# Patient Record
Sex: FEMALE | Race: WHITE | NOT HISPANIC OR LATINO | Employment: OTHER | URBAN - METROPOLITAN AREA
[De-identification: names, ages, dates, MRNs, and addresses within clinical notes are randomized per-mention and may not be internally consistent; named-entity substitution may affect disease eponyms.]

---

## 2018-01-11 NOTE — MISCELLANEOUS
Message   Recorded as Task   Date: 09/02/2016 09:49 AM, Created By: Wale Oneal   Task Name: Follow Up   Assigned To: Iliana Aguila   Regarding Patient: Lizabeth Meigs, Status: Active   Comment:    SdAna Maria - 02 Sep 2016 9:49 AM     TASK CREATED  Caller: Self; [phone] (Home); [phone] (Work)  Pt is going to stop taking her Arimidex this month , should she come in for follow up appointment  Ana Maria Beaver - 02 Sep 2016 10:10 AM     TASK EDITED   Spoke with pt who hasn't been seen since 2014 and informed does need to continue FU  Scheduled appt with WILLIAM Garcia on 9/16/16  Will mail lab slip and provide 30 day supply of Arimidex  Active Problems    1  Arthritis (716 90) (M19 90)   2  Cat scratch (919 0,E906 8) (T14 8,W55 03XA)   3  Hypertension (401 9) (I10)   4  Lower abdominal pain (789 09) (R10 30)   5  Malignant neoplasm of breast (174 9) (C50 919)   6  Screening for cervical cancer (V76 2) (Z12 4)   7  Screening for colorectal cancer (V76 51) (Z12 11,Z12 12)    Current Meds   1  Anastrozole 1 MG Oral Tablet; TAKE 1 TABLET ONCE DAILY; Last Rx:[id number]   Ordered   2  Aspir-Low 81 MG Oral Tablet Delayed Release; TAKE 1 TABLET DAILY; Therapy: (Recorded:04May2013) to Recorded   3  Calcium Citrate + TABS; Therapy: (Recorded:04May2013) to Recorded   4  Cod Liver Oil 10 MINIM Oral Capsule Recorded   5  Coreg 6 25 MG Oral Tablet (Carvedilol); Therapy: ([id number]) to Recorded   6  Multi-Day Vitamins TABS; Therapy: (Recorded:04May2013) to Recorded   7  Vitamin D CAPS; Therapy: (Recorded:04May2013) to Recorded    Allergies    1  Tylenol PM Extra Strength TABS    Plan  Malignant neoplasm of breast    · (1) CA 27 29; Status:Active - Retrospective By Protocol Authorization; Requested  for:[id number];    · (1) CBC/PLT/DIFF; Status:Active - Retrospective By Protocol Authorization;  Requested  for:[id number];    · (1) COMPREHENSIVE METABOLIC PANEL; Status:Active - Retrospective By Protocol  Authorization;  Requested BFA:[id number];     Signatures   Electronically signed by : Erik Gordon RN; Sep  2 2016 11:17AM EST                       (Author)

## 2018-05-10 ENCOUNTER — TRANSCRIBE ORDERS (OUTPATIENT)
Dept: LAB | Facility: HOSPITAL | Age: 58
End: 2018-05-10

## 2018-05-10 ENCOUNTER — APPOINTMENT (OUTPATIENT)
Dept: LAB | Facility: HOSPITAL | Age: 58
End: 2018-05-10
Attending: INTERNAL MEDICINE
Payer: COMMERCIAL

## 2018-05-10 DIAGNOSIS — I10 ESSENTIAL HYPERTENSION, MALIGNANT: ICD-10-CM

## 2018-05-10 DIAGNOSIS — E55.9 AVITAMINOSIS D: ICD-10-CM

## 2018-05-10 DIAGNOSIS — I10 ESSENTIAL HYPERTENSION, MALIGNANT: Primary | ICD-10-CM

## 2018-05-10 LAB
25(OH)D3 SERPL-MCNC: 33.3 NG/ML (ref 30–100)
BACTERIA UR QL AUTO: ABNORMAL /HPF
BASOPHILS # BLD AUTO: 0.03 THOUSANDS/ΜL (ref 0–0.1)
BASOPHILS NFR BLD AUTO: 0 % (ref 0–1)
BILIRUB UR QL STRIP: NEGATIVE
CHOLEST SERPL-MCNC: 144 MG/DL (ref 50–200)
CLARITY UR: ABNORMAL
COLOR UR: YELLOW
EOSINOPHIL # BLD AUTO: 0.11 THOUSAND/ΜL (ref 0–0.61)
EOSINOPHIL NFR BLD AUTO: 1 % (ref 0–6)
ERYTHROCYTE [DISTWIDTH] IN BLOOD BY AUTOMATED COUNT: 12.6 % (ref 11.6–15.1)
GLUCOSE UR STRIP-MCNC: NEGATIVE MG/DL
HCT VFR BLD AUTO: 42.1 % (ref 34.8–46.1)
HDLC SERPL-MCNC: 38 MG/DL (ref 40–60)
HGB BLD-MCNC: 13.8 G/DL (ref 11.5–15.4)
HGB UR QL STRIP.AUTO: NEGATIVE
HYALINE CASTS #/AREA URNS LPF: ABNORMAL /LPF
KETONES UR STRIP-MCNC: NEGATIVE MG/DL
LDLC SERPL CALC-MCNC: 88 MG/DL (ref 0–100)
LEUKOCYTE ESTERASE UR QL STRIP: NEGATIVE
LYMPHOCYTES # BLD AUTO: 1.99 THOUSANDS/ΜL (ref 0.6–4.47)
LYMPHOCYTES NFR BLD AUTO: 26 % (ref 14–44)
MCH RBC QN AUTO: 31.6 PG (ref 26.8–34.3)
MCHC RBC AUTO-ENTMCNC: 32.8 G/DL (ref 31.4–37.4)
MCV RBC AUTO: 96 FL (ref 82–98)
MONOCYTES # BLD AUTO: 0.53 THOUSAND/ΜL (ref 0.17–1.22)
MONOCYTES NFR BLD AUTO: 7 % (ref 4–12)
NEUTROPHILS # BLD AUTO: 5 THOUSANDS/ΜL (ref 1.85–7.62)
NEUTS SEG NFR BLD AUTO: 66 % (ref 43–75)
NITRITE UR QL STRIP: NEGATIVE
NON-SQ EPI CELLS URNS QL MICRO: ABNORMAL /HPF
NONHDLC SERPL-MCNC: 106 MG/DL
NRBC BLD AUTO-RTO: 0 /100 WBCS
PH UR STRIP.AUTO: 6 [PH] (ref 4.5–8)
PLATELET # BLD AUTO: 258 THOUSANDS/UL (ref 149–390)
PMV BLD AUTO: 11.4 FL (ref 8.9–12.7)
PROT UR STRIP-MCNC: NEGATIVE MG/DL
RBC # BLD AUTO: 4.37 MILLION/UL (ref 3.81–5.12)
RBC #/AREA URNS AUTO: ABNORMAL /HPF
SP GR UR STRIP.AUTO: 1.01 (ref 1–1.03)
TRIGL SERPL-MCNC: 88 MG/DL
UROBILINOGEN UR QL STRIP.AUTO: 0.2 E.U./DL
WBC # BLD AUTO: 7.69 THOUSAND/UL (ref 4.31–10.16)
WBC #/AREA URNS AUTO: ABNORMAL /HPF

## 2018-05-10 PROCEDURE — 85025 COMPLETE CBC W/AUTO DIFF WBC: CPT

## 2018-05-10 PROCEDURE — 80061 LIPID PANEL: CPT

## 2018-05-10 PROCEDURE — 36415 COLL VENOUS BLD VENIPUNCTURE: CPT

## 2018-05-10 PROCEDURE — 82306 VITAMIN D 25 HYDROXY: CPT

## 2018-05-10 PROCEDURE — 81001 URINALYSIS AUTO W/SCOPE: CPT

## 2019-09-11 ENCOUNTER — TRANSCRIBE ORDERS (OUTPATIENT)
Dept: ADMINISTRATIVE | Age: 59
End: 2019-09-11

## 2019-09-11 ENCOUNTER — APPOINTMENT (OUTPATIENT)
Dept: LAB | Age: 59
End: 2019-09-11
Payer: COMMERCIAL

## 2019-09-11 DIAGNOSIS — E55.9 VITAMIN D DEFICIENCY: ICD-10-CM

## 2019-09-11 DIAGNOSIS — R00.2 PALPITATIONS: ICD-10-CM

## 2019-09-11 DIAGNOSIS — I10 ESSENTIAL HYPERTENSION: ICD-10-CM

## 2019-09-11 DIAGNOSIS — Z00.00 HEALTH MAINTENANCE EXAMINATION: ICD-10-CM

## 2019-09-11 DIAGNOSIS — Z00.00 HEALTH MAINTENANCE EXAMINATION: Primary | ICD-10-CM

## 2019-09-11 LAB
25(OH)D3 SERPL-MCNC: 30.2 NG/ML (ref 30–100)
ALBUMIN SERPL BCP-MCNC: 3.7 G/DL (ref 3.5–5)
ALP SERPL-CCNC: 108 U/L (ref 46–116)
ALT SERPL W P-5'-P-CCNC: 27 U/L (ref 12–78)
ANION GAP SERPL CALCULATED.3IONS-SCNC: 3 MMOL/L (ref 4–13)
AST SERPL W P-5'-P-CCNC: 21 U/L (ref 5–45)
BASOPHILS # BLD AUTO: 0.05 THOUSANDS/ΜL (ref 0–0.1)
BASOPHILS NFR BLD AUTO: 1 % (ref 0–1)
BILIRUB SERPL-MCNC: 0.5 MG/DL (ref 0.2–1)
BILIRUB UR QL STRIP: NEGATIVE
BUN SERPL-MCNC: 15 MG/DL (ref 5–25)
CALCIUM SERPL-MCNC: 8.6 MG/DL (ref 8.3–10.1)
CHLORIDE SERPL-SCNC: 106 MMOL/L (ref 100–108)
CHOLEST SERPL-MCNC: 165 MG/DL (ref 50–200)
CLARITY UR: CLEAR
CO2 SERPL-SCNC: 28 MMOL/L (ref 21–32)
COLOR UR: YELLOW
CREAT SERPL-MCNC: 0.78 MG/DL (ref 0.6–1.3)
EOSINOPHIL # BLD AUTO: 0.09 THOUSAND/ΜL (ref 0–0.61)
EOSINOPHIL NFR BLD AUTO: 1 % (ref 0–6)
ERYTHROCYTE [DISTWIDTH] IN BLOOD BY AUTOMATED COUNT: 12.5 % (ref 11.6–15.1)
GFR SERPL CREATININE-BSD FRML MDRD: 83 ML/MIN/1.73SQ M
GLUCOSE P FAST SERPL-MCNC: 89 MG/DL (ref 65–99)
GLUCOSE UR STRIP-MCNC: NEGATIVE MG/DL
HCT VFR BLD AUTO: 39.1 % (ref 34.8–46.1)
HDLC SERPL-MCNC: 39 MG/DL (ref 40–60)
HGB BLD-MCNC: 13 G/DL (ref 11.5–15.4)
HGB UR QL STRIP.AUTO: NEGATIVE
IMM GRANULOCYTES # BLD AUTO: 0.02 THOUSAND/UL (ref 0–0.2)
IMM GRANULOCYTES NFR BLD AUTO: 0 % (ref 0–2)
KETONES UR STRIP-MCNC: NEGATIVE MG/DL
LDLC SERPL CALC-MCNC: 106 MG/DL (ref 0–100)
LEUKOCYTE ESTERASE UR QL STRIP: NEGATIVE
LYMPHOCYTES # BLD AUTO: 1.74 THOUSANDS/ΜL (ref 0.6–4.47)
LYMPHOCYTES NFR BLD AUTO: 25 % (ref 14–44)
MCH RBC QN AUTO: 31.5 PG (ref 26.8–34.3)
MCHC RBC AUTO-ENTMCNC: 33.2 G/DL (ref 31.4–37.4)
MCV RBC AUTO: 95 FL (ref 82–98)
MONOCYTES # BLD AUTO: 0.52 THOUSAND/ΜL (ref 0.17–1.22)
MONOCYTES NFR BLD AUTO: 7 % (ref 4–12)
NEUTROPHILS # BLD AUTO: 4.64 THOUSANDS/ΜL (ref 1.85–7.62)
NEUTS SEG NFR BLD AUTO: 66 % (ref 43–75)
NITRITE UR QL STRIP: NEGATIVE
NONHDLC SERPL-MCNC: 126 MG/DL
NRBC BLD AUTO-RTO: 0 /100 WBCS
PH UR STRIP.AUTO: 6 [PH]
PLATELET # BLD AUTO: 225 THOUSANDS/UL (ref 149–390)
PMV BLD AUTO: 11.3 FL (ref 8.9–12.7)
POTASSIUM SERPL-SCNC: 3.7 MMOL/L (ref 3.5–5.3)
PROT SERPL-MCNC: 8.1 G/DL (ref 6.4–8.2)
PROT UR STRIP-MCNC: NEGATIVE MG/DL
RBC # BLD AUTO: 4.13 MILLION/UL (ref 3.81–5.12)
SODIUM SERPL-SCNC: 137 MMOL/L (ref 136–145)
SP GR UR STRIP.AUTO: 1.01 (ref 1–1.03)
TRIGL SERPL-MCNC: 102 MG/DL
TSH SERPL DL<=0.05 MIU/L-ACNC: 3.54 UIU/ML (ref 0.36–3.74)
UROBILINOGEN UR QL STRIP.AUTO: 0.2 E.U./DL
WBC # BLD AUTO: 7.06 THOUSAND/UL (ref 4.31–10.16)

## 2019-09-11 PROCEDURE — 80053 COMPREHEN METABOLIC PANEL: CPT

## 2019-09-11 PROCEDURE — 82306 VITAMIN D 25 HYDROXY: CPT

## 2019-09-11 PROCEDURE — 81003 URINALYSIS AUTO W/O SCOPE: CPT | Performed by: INTERNAL MEDICINE

## 2019-09-11 PROCEDURE — 84443 ASSAY THYROID STIM HORMONE: CPT

## 2019-09-11 PROCEDURE — 36415 COLL VENOUS BLD VENIPUNCTURE: CPT

## 2019-09-11 PROCEDURE — 80061 LIPID PANEL: CPT

## 2019-09-11 PROCEDURE — 85025 COMPLETE CBC W/AUTO DIFF WBC: CPT

## 2020-07-23 ENCOUNTER — OFFICE VISIT (OUTPATIENT)
Dept: OBGYN CLINIC | Facility: CLINIC | Age: 60
End: 2020-07-23
Payer: COMMERCIAL

## 2020-07-23 VITALS
TEMPERATURE: 98 F | BODY MASS INDEX: 40.97 KG/M2 | HEIGHT: 67 IN | WEIGHT: 261 LBS | DIASTOLIC BLOOD PRESSURE: 72 MMHG | SYSTOLIC BLOOD PRESSURE: 126 MMHG

## 2020-07-23 DIAGNOSIS — Z01.419 ENCOUNTER FOR GYNECOLOGICAL EXAMINATION WITHOUT ABNORMAL FINDING: Primary | ICD-10-CM

## 2020-07-23 DIAGNOSIS — Z01.419 PAP SMEAR, AS PART OF ROUTINE GYNECOLOGICAL EXAMINATION: ICD-10-CM

## 2020-07-23 DIAGNOSIS — N95.0 PMB (POSTMENOPAUSAL BLEEDING): ICD-10-CM

## 2020-07-23 PROCEDURE — G0145 SCR C/V CYTO,THINLAYER,RESCR: HCPCS | Performed by: OBSTETRICS & GYNECOLOGY

## 2020-07-23 PROCEDURE — 88305 TISSUE EXAM BY PATHOLOGIST: CPT | Performed by: PATHOLOGY

## 2020-07-23 PROCEDURE — 87624 HPV HI-RISK TYP POOLED RSLT: CPT | Performed by: OBSTETRICS & GYNECOLOGY

## 2020-07-23 PROCEDURE — 99386 PREV VISIT NEW AGE 40-64: CPT | Performed by: OBSTETRICS & GYNECOLOGY

## 2020-07-23 PROCEDURE — 58100 BIOPSY OF UTERUS LINING: CPT | Performed by: OBSTETRICS & GYNECOLOGY

## 2020-07-23 RX ORDER — CARVEDILOL PHOSPHATE 20 MG/1
20 CAPSULE, EXTENDED RELEASE ORAL EVERY MORNING
COMMUNITY
Start: 2020-07-10

## 2020-07-23 RX ORDER — IBUPROFEN 800 MG
2000 TABLET ORAL DAILY
COMMUNITY

## 2020-07-23 RX ORDER — PERPHENAZINE/AMITRIPTYLINE HCL 4 MG-25 MG
TABLET ORAL DAILY
COMMUNITY

## 2020-07-23 RX ORDER — ANASTROZOLE 1 MG/1
1 TABLET ORAL DAILY
Status: ON HOLD | COMMUNITY
End: 2020-11-09 | Stop reason: ALTCHOICE

## 2020-07-23 RX ORDER — ASPIRIN 81 MG/1
1 TABLET ORAL DAILY
Status: ON HOLD | COMMUNITY
End: 2021-02-11

## 2020-07-23 RX ORDER — LANOLIN ALCOHOL/MO/W.PET/CERES
CREAM (GRAM) TOPICAL DAILY
COMMUNITY

## 2020-07-23 RX ORDER — MULTIVITAMIN
TABLET ORAL DAILY
Status: ON HOLD | COMMUNITY
End: 2021-02-11

## 2020-07-23 RX ORDER — POTASSIUM GLUCONATE 595(99)MG
TABLET ORAL DAILY
COMMUNITY

## 2020-07-23 NOTE — PROGRESS NOTES
Assessment/Plan:    No problem-specific Assessment & Plan notes found for this encounter  Diagnoses and all orders for this visit:    Encounter for gynecological examination without abnormal finding    Pap smear, as part of routine gynecological examination    Other orders  -     aspirin (Aspir-Low) 81 mg EC tablet; Take 1 tablet by mouth daily  -     anastrozole (ARIMIDEX) 1 mg tablet; Take 1 tablet by mouth daily  -     carvedilol (COREG CR) 20 MG 24 hr capsule; 20 mg every morning  -     calcium citrate-vitamin D (CITRACAL+D) 315-200 MG-UNIT per tablet; Take by mouth  -     Multiple Vitamin (MULTI-DAY VITAMINS) TABS; Take by mouth  -     Cod Liver Oil 10 MINIM CAPS; Take by mouth  -     Cholecalciferol (VITAMIN D3) 10 MCG (400 UNIT) CAPS; Take by mouth  -     Lutein 40 MG CAPS; Take by mouth        Gynecological physical examination significant for postmenopausal bleeding  Endometrial biopsy and Pap smear performed in office today without complications  Patient to follow up on Monday for pelvic ultrasound and results will be discussed with patient afterwards  Self-breast examination stressed given history of breast cancer  Mammogram ordered  Referral for Gastroenterology and PCP provided  Discussed regular exercise, healthy diet, importance of vitamin D and calcium supplements  Discussed importance of sun block use during periods of prolonged sun exposure  Patient will be seen in 1 year for routine gynecologic and medical examination  Patient will call office for any problems, concerns, or issues which may arise during the interim  Subjective:      Patient ID: Yarely Barrera is a 61 y o  female  Patient is a 10year-old female who presents today with complaint of postmenopausal bleeding x3 weeks  Patient reports that she noticed blood in the toilet intermittently for the past 3 weeks  Patient initially attributed it to her past history of small nephrolithiasis use    However patient reported sensation of menstrual cramping on Sunday which was accompanied with of vaginal bleed off similar to when she used to have periods  Patient noted large clots with the vaginal bleeding  Patient then noticed spotting on Monday  Patient denies abdominal or pelvic bloating or pain  Patient denies unintentional weight loss, chills, fatigue, nausea, vomiting, diarrhea, constipation, dysuria, hematuria, urinary incontinence, or other irregular vaginal discharge  Patient is not currently sexually active  Patient has a history of right hormone receptor positive breast cancer for which she was treated with neoadjuvant chemo therapy, lumpectomy, radiation, 5 years of tamoxifen, and 5 years of Arimidex  Patient denies fevers, new rashes or lesions, chest pain, shortness of breath, headache, changes in vision, breast masses or tenderness  Patient does not have a primary care physician  Patient is currently being followed by Cardiology for hypertension  Patient is due for her annual colonoscopy, mammogram, and Pap smear this year  Patient denies tobacco or illicit drug use  Endometrial biopsy was successfully performed in the office today and patient is scheduled for pelvic ultrasound on Monday  Patient will be called with results  Patient to follow up with an the next year for her annual gynecologic examination  Patient encouraged to regularity see a PCP  The following portions of the patient's history were reviewed and updated as appropriate: allergies, current medications, past family history, past medical history, past social history, past surgical history and problem list     Review of Systems   Constitutional: Negative  Negative for appetite change, diaphoresis, fatigue, fever and unexpected weight change  HENT: Negative  Eyes: Negative  Respiratory: Negative  Cardiovascular: Negative  Followed for hypertension  Gastrointestinal: Negative    Negative for abdominal distention, abdominal pain, anal bleeding, blood in stool, constipation, diarrhea, nausea, rectal pain and vomiting  Endocrine: Negative  Negative for cold intolerance and heat intolerance  Genitourinary: Positive for vaginal bleeding  Negative for decreased urine volume, difficulty urinating, dysuria, enuresis, flank pain, frequency, hematuria, pelvic pain, urgency, vaginal discharge and vaginal pain  Musculoskeletal: Negative  Skin: Negative  Allergic/Immunologic: Negative  Neurological: Negative  Hematological: Negative  Negative for adenopathy  Psychiatric/Behavioral: Negative  Followed for anxiety  Objective:      /72   Temp 98 °F (36 7 °C)   Ht 5' 7" (1 702 m)   Wt 118 kg (261 lb)   BMI 40 88 kg/m²          Physical Exam   Constitutional: She is oriented to person, place, and time  She appears well-developed  No distress  HENT:   Head: Normocephalic and atraumatic  Eyes: Pupils are equal, round, and reactive to light  EOM are normal    Neck: Normal range of motion  Neck supple  Cardiovascular: Normal rate, regular rhythm and normal heart sounds  Exam reveals no gallop and no friction rub  No murmur heard  Pulmonary/Chest: Effort normal and breath sounds normal  Right breast exhibits no inverted nipple, no mass, no nipple discharge, no skin change and no tenderness  Left breast exhibits no inverted nipple, no mass, no nipple discharge, no skin change and no tenderness  Breasts are symmetrical    Abdominal: Soft  Normal appearance and bowel sounds are normal    Genitourinary: Rectum normal, vagina normal and uterus normal  Rectal exam shows guaiac negative stool  Pelvic exam was performed with patient supine  There is no rash or lesion on the right labia  There is no rash or lesion on the left labia  Uterus is not enlarged and not tender  Cervix exhibits no discharge and no friability  Right adnexum displays no mass, no tenderness and no fullness   Left adnexum displays no mass, no tenderness and no fullness  No erythema, tenderness or bleeding in the vagina  No vaginal discharge found  Genitourinary Comments: Pap smear obtained  Endometrial biopsy successfully performed in office  Mild bleeding elicited  Bleeding cauterized with Monsel's solution  Good pelvic support  Musculoskeletal: Normal range of motion  She exhibits no edema  Lymphadenopathy:     She has no cervical adenopathy  She has no axillary adenopathy  Right: No inguinal and no supraclavicular adenopathy present  Left: No inguinal and no supraclavicular adenopathy present  Neurological: She is alert and oriented to person, place, and time  Skin: Skin is warm, dry and intact  No rash noted  She is not diaphoretic  Psychiatric: She has a normal mood and affect   Her speech is normal and behavior is normal  Judgment and thought content normal  Cognition and memory are normal

## 2020-07-23 NOTE — PROGRESS NOTES
Endometrial biopsy  Date/Time: 7/23/2020 3:18 PM  Performed by: Pierce Bailey MD  Authorized by: Pierce Bailey MD     Consent:     Consent given by:  Patient    Procedural risks discussed:  Bleeding    Patient questions answered: yes      Patient agrees, verbalizes understanding, and wants to proceed: yes      Educational handouts given: no      Instructions and paperwork completed: yes    Universal protocol:     Patient states understanding of procedure being performed: yes      Relevant documents present and verified: yes      Test results available and properly labeled: yes      Imaging studies available: no      Required blood products, implants, devices, and special equipment available: yes      Site marked: no    Pre-procedure:     Pre-procedure timeout performed: yes    Procedure:     Procedure: endometrial biopsy with Pipelle      A bivalve speculum was placed in the vagina: yes      Cervix cleaned and prepped: yes      A paracervical block was performed: no      An intracervical block was performed: no      The cervix was dilated: yes      Uterus sounded: no      Specimen collected: specimen collected and sent to pathology      Patient tolerated procedure well with no complications: yes    Findings:     Uterus size:  6-8 weeks    Cervix: normal      Adnexa: normal    Comments:      Topic:  Postmenopausal bleeding    Endometrial biopsy performed at today's visit    All questions answered for the patient during today's visit    Excellent hemostasis confirmed    Further treatment and follow-up planning will be based upon final pathology results    Patient to call for any problems, questions issues or concerns which may arise for her

## 2020-07-23 NOTE — PATIENT INSTRUCTIONS
Gynecological physical examination significant for postmenopausal bleeding  Endometrial biopsy and Pap smear performed in office today without complications  Patient to follow up on Monday for pelvic ultrasound and results will be discussed with patient afterwards  Self-breast examination stressed given history of breast cancer  Mammogram ordered  Referral for Gastroenterology and PCP provided  Discussed regular exercise, healthy diet, importance of vitamin D and calcium supplements  Discussed importance of sun block use during periods of prolonged sun exposure  Patient will be seen in 1 year for routine gynecologic and medical examination  Patient will call office for any problems, concerns, or issues which may arise during the interim

## 2020-07-27 ENCOUNTER — ULTRASOUND (OUTPATIENT)
Dept: OBGYN CLINIC | Facility: CLINIC | Age: 60
End: 2020-07-27
Payer: COMMERCIAL

## 2020-07-27 DIAGNOSIS — N95.0 PMB (POSTMENOPAUSAL BLEEDING): Primary | ICD-10-CM

## 2020-07-27 PROCEDURE — 76856 US EXAM PELVIC COMPLETE: CPT | Performed by: OBSTETRICS & GYNECOLOGY

## 2020-07-27 NOTE — PROGRESS NOTES
AMB US Pelvic Non OB  Date/Time: 7/27/2020 11:10 AM  Performed by: Ronne Soulier  Authorized by: Obdulia Plaza MD     Procedure details:     Indications comment:  PM Bleed    Technique:  US Pelvic, Non-OB with complete exam  Uterine findings:     Length (cm): 5 7    Height (cm):  4 4    Width (cm):  3 8    Uterine adhesions: not identified      Adnexal mass: not identified      Polyps: not identified      Myomas: not identified      Endometrial stripe: identified      Endometrial hyperplasia: not identified      Endometrium thickness (mm):  10  Left ovary findings:     Left ovary:  Visualized    Cysts: not identified      Length (cm): 2 1    Height (cm): 2 5    Width (cm): 1 9  Right ovary findings:     Right ovary:  Visualized    Cysts: not identified      Length (cm): 2 6    Height (cm): 2 3    Width (cm): 2 3  Other findings:     Free pelvic fluid: not identified      Free peritoneal fluid: not identified    Post-Procedure Details:     Impression:  Endo=10mm=Biopsy    Tolerance:   Tolerated well, no immediate complications

## 2020-07-29 LAB
HPV HR 12 DNA CVX QL NAA+PROBE: NEGATIVE
HPV16 DNA CVX QL NAA+PROBE: NEGATIVE
HPV18 DNA CVX QL NAA+PROBE: NEGATIVE

## 2020-07-30 ENCOUNTER — TELEPHONE (OUTPATIENT)
Dept: OBGYN CLINIC | Facility: CLINIC | Age: 60
End: 2020-07-30

## 2020-07-30 LAB
LAB AP GYN PRIMARY INTERPRETATION: NORMAL
Lab: NORMAL

## 2020-07-30 NOTE — TELEPHONE ENCOUNTER
----- Message from Javy Padilla MD sent at 7/30/2020 10:32 AM EDT -----  Biopsy showed normal cervical tissue but we need to obtain more sampling of the uterus    Please make an appointment so I can  discuss this with the patient in person and explained what the next step will be    Thanks

## 2020-07-31 NOTE — PROGRESS NOTES
Endometrial biopsy did not show adequate endometrial tissue    Patient will be scheduled for hysteroscopy, D&C and polypectomy if necessary in the near future

## 2020-08-05 ENCOUNTER — OFFICE VISIT (OUTPATIENT)
Dept: OBGYN CLINIC | Facility: CLINIC | Age: 60
End: 2020-08-05
Payer: COMMERCIAL

## 2020-08-05 VITALS
BODY MASS INDEX: 40.88 KG/M2 | TEMPERATURE: 97.4 F | SYSTOLIC BLOOD PRESSURE: 126 MMHG | DIASTOLIC BLOOD PRESSURE: 72 MMHG | WEIGHT: 261 LBS

## 2020-08-05 DIAGNOSIS — N95.0 PMB (POSTMENOPAUSAL BLEEDING): ICD-10-CM

## 2020-08-05 DIAGNOSIS — Z71.9 ENCOUNTER FOR CONSULTATION: Primary | ICD-10-CM

## 2020-08-05 PROCEDURE — 99213 OFFICE O/P EST LOW 20 MIN: CPT | Performed by: OBSTETRICS & GYNECOLOGY

## 2020-08-05 NOTE — PROGRESS NOTES
Assessment/Plan:    No problem-specific Assessment & Plan notes found for this encounter  There are no diagnoses linked to this encounter  Subjective:      Patient ID: Ami Garcia is a 61 y o  female  Sneha Potter is a 80-year-old female presenting to the office to discuss her recent tissue exam results  Patient was told that there was not enough tissue to determine results for uterine malignancy  Patient was recommended to have inpatient surgical procedure done to view the uterine cavity and obtain biopsies  Patient was agreeable to the plan, and agreed to meet with Dr Martina Adames to discuss the procedure further  Patient understands that this needs to be done within the next month or so a treatment plan can be formulated  All questions were answered for her at today's visit    In addition, referral was made to Dr Karen Posadas for a complete medical evaluation    Patient was told to call for any problems, questions, issues or concerns which may arise for her      Total time of today's visit was 20 minutes of which greater than 50% was spent face-to-face counseling the patient as well as coordination of care  Review of Systems   Constitutional: Negative  Negative for appetite change, diaphoresis, fatigue, fever and unexpected weight change  HENT: Negative  Eyes: Negative  Respiratory: Negative  Cardiovascular: Negative  Gastrointestinal: Negative  Negative for abdominal pain, blood in stool, constipation, diarrhea, nausea and vomiting  Endocrine: Negative  Negative for cold intolerance and heat intolerance  Genitourinary: Negative  Negative for dysuria, frequency, hematuria, urgency, vaginal bleeding, vaginal discharge and vaginal pain  Musculoskeletal: Negative  Skin: Negative  Allergic/Immunologic: Negative  Neurological: Negative  Hematological: Negative  Negative for adenopathy  Psychiatric/Behavioral: Negative  Objective:      /72   Temp (!) 97 4 °F (36 3 °C)   Wt 118 kg (261 lb)   BMI 40 88 kg/m²          Physical Exam   Constitutional: She is oriented to person, place, and time  She appears well-developed  HENT:   Head: Normocephalic  Eyes: Pupils are equal, round, and reactive to light  Neck: Normal range of motion  Neck supple  Pulmonary/Chest: Effort normal    Musculoskeletal: Normal range of motion  Neurological: She is alert and oriented to person, place, and time  Skin: Skin is warm and dry     Psychiatric: Her behavior is normal  Judgment and thought content normal

## 2020-08-05 NOTE — PATIENT INSTRUCTIONS
Topic:  Discussion of endometrial biopsy results    I explained to the patient that we were not able to obtain adequate tissue sampling of the uterine cavity    For that reason, I will refer her to Dr Sukhdeep Keller for consultation and plan for diagnostic hysteroscopy with D and C and possible polypectomy    All questions were answered in detail for her during today's visit    Patient will call for any problems, questions, issues or concerns which may arise for her

## 2020-10-01 ENCOUNTER — OFFICE VISIT (OUTPATIENT)
Dept: OBGYN CLINIC | Facility: CLINIC | Age: 60
End: 2020-10-01
Payer: COMMERCIAL

## 2020-10-01 VITALS
WEIGHT: 261 LBS | SYSTOLIC BLOOD PRESSURE: 128 MMHG | BODY MASS INDEX: 40.97 KG/M2 | DIASTOLIC BLOOD PRESSURE: 72 MMHG | HEIGHT: 67 IN | TEMPERATURE: 98 F

## 2020-10-01 DIAGNOSIS — N95.0 POSTMENOPAUSAL BLEEDING: Primary | ICD-10-CM

## 2020-10-01 PROCEDURE — 99214 OFFICE O/P EST MOD 30 MIN: CPT | Performed by: OBSTETRICS & GYNECOLOGY

## 2020-10-09 ENCOUNTER — TELEPHONE (OUTPATIENT)
Dept: OBGYN CLINIC | Facility: CLINIC | Age: 60
End: 2020-10-09

## 2020-10-09 ENCOUNTER — PREP FOR PROCEDURE (OUTPATIENT)
Dept: OBGYN CLINIC | Facility: CLINIC | Age: 60
End: 2020-10-09

## 2020-10-09 DIAGNOSIS — N95.0 POSTMENOPAUSAL BLEEDING: Primary | ICD-10-CM

## 2020-10-09 DIAGNOSIS — Z01.818 PREOP TESTING: ICD-10-CM

## 2020-10-13 RX ORDER — SODIUM CHLORIDE, SODIUM LACTATE, POTASSIUM CHLORIDE, CALCIUM CHLORIDE 600; 310; 30; 20 MG/100ML; MG/100ML; MG/100ML; MG/100ML
125 INJECTION, SOLUTION INTRAVENOUS CONTINUOUS
Status: CANCELLED | OUTPATIENT
Start: 2020-10-13

## 2020-10-18 ENCOUNTER — TRANSCRIBE ORDERS (OUTPATIENT)
Dept: LAB | Facility: CLINIC | Age: 60
End: 2020-10-18

## 2020-10-18 ENCOUNTER — APPOINTMENT (OUTPATIENT)
Dept: LAB | Facility: CLINIC | Age: 60
End: 2020-10-18
Payer: COMMERCIAL

## 2020-10-18 DIAGNOSIS — Z01.818 PREOP TESTING: ICD-10-CM

## 2020-10-18 DIAGNOSIS — E55.9 VITAMIN D DEFICIENCY DISEASE: ICD-10-CM

## 2020-10-18 DIAGNOSIS — I10 HYPERTENSION, UNSPECIFIED TYPE: ICD-10-CM

## 2020-10-18 DIAGNOSIS — Z00.00 ROUTINE GENERAL MEDICAL EXAMINATION AT A HEALTH CARE FACILITY: ICD-10-CM

## 2020-10-18 DIAGNOSIS — E55.9 VITAMIN D DEFICIENCY DISEASE: Primary | ICD-10-CM

## 2020-10-18 DIAGNOSIS — R00.2 PALPITATIONS: ICD-10-CM

## 2020-10-18 LAB
25(OH)D3 SERPL-MCNC: 33.6 NG/ML (ref 30–100)
ALBUMIN SERPL BCP-MCNC: 3.3 G/DL (ref 3.5–5)
ALP SERPL-CCNC: 92 U/L (ref 46–116)
ALT SERPL W P-5'-P-CCNC: 21 U/L (ref 12–78)
ANION GAP SERPL CALCULATED.3IONS-SCNC: 7 MMOL/L (ref 4–13)
AST SERPL W P-5'-P-CCNC: 14 U/L (ref 5–45)
BACTERIA UR QL AUTO: ABNORMAL /HPF
BASOPHILS # BLD AUTO: 0.05 THOUSANDS/ΜL (ref 0–0.1)
BASOPHILS NFR BLD AUTO: 1 % (ref 0–1)
BILIRUB SERPL-MCNC: 0.42 MG/DL (ref 0.2–1)
BILIRUB UR QL STRIP: NEGATIVE
BUN SERPL-MCNC: 17 MG/DL (ref 5–25)
CALCIUM ALBUM COR SERPL-MCNC: 8.9 MG/DL (ref 8.3–10.1)
CALCIUM SERPL-MCNC: 8.3 MG/DL (ref 8.3–10.1)
CHLORIDE SERPL-SCNC: 105 MMOL/L (ref 100–108)
CHOLEST SERPL-MCNC: 166 MG/DL (ref 50–200)
CLARITY UR: ABNORMAL
CO2 SERPL-SCNC: 28 MMOL/L (ref 21–32)
COLOR UR: YELLOW
CREAT SERPL-MCNC: 0.78 MG/DL (ref 0.6–1.3)
CREAT UR-MCNC: 68 MG/DL
EOSINOPHIL # BLD AUTO: 0.14 THOUSAND/ΜL (ref 0–0.61)
EOSINOPHIL NFR BLD AUTO: 2 % (ref 0–6)
ERYTHROCYTE [DISTWIDTH] IN BLOOD BY AUTOMATED COUNT: 12.4 % (ref 11.6–15.1)
EST. AVERAGE GLUCOSE BLD GHB EST-MCNC: 103 MG/DL
GFR SERPL CREATININE-BSD FRML MDRD: 83 ML/MIN/1.73SQ M
GLUCOSE P FAST SERPL-MCNC: 96 MG/DL (ref 65–99)
GLUCOSE UR STRIP-MCNC: NEGATIVE MG/DL
HBA1C MFR BLD: 5.2 %
HCT VFR BLD AUTO: 39.2 % (ref 34.8–46.1)
HDLC SERPL-MCNC: 39 MG/DL
HGB BLD-MCNC: 13.5 G/DL (ref 11.5–15.4)
HGB UR QL STRIP.AUTO: ABNORMAL
IMM GRANULOCYTES # BLD AUTO: 0.03 THOUSAND/UL (ref 0–0.2)
IMM GRANULOCYTES NFR BLD AUTO: 1 % (ref 0–2)
INR PPP: 1 (ref 0.84–1.19)
KETONES UR STRIP-MCNC: NEGATIVE MG/DL
LDLC SERPL CALC-MCNC: 109 MG/DL (ref 0–100)
LEUKOCYTE ESTERASE UR QL STRIP: NEGATIVE
LYMPHOCYTES # BLD AUTO: 1.58 THOUSANDS/ΜL (ref 0.6–4.47)
LYMPHOCYTES NFR BLD AUTO: 24 % (ref 14–44)
MCH RBC QN AUTO: 31.9 PG (ref 26.8–34.3)
MCHC RBC AUTO-ENTMCNC: 34.4 G/DL (ref 31.4–37.4)
MCV RBC AUTO: 93 FL (ref 82–98)
MICROALBUMIN UR-MCNC: 5.8 MG/L (ref 0–20)
MICROALBUMIN/CREAT 24H UR: 9 MG/G CREATININE (ref 0–30)
MONOCYTES # BLD AUTO: 0.51 THOUSAND/ΜL (ref 0.17–1.22)
MONOCYTES NFR BLD AUTO: 8 % (ref 4–12)
NEUTROPHILS # BLD AUTO: 4.25 THOUSANDS/ΜL (ref 1.85–7.62)
NEUTS SEG NFR BLD AUTO: 64 % (ref 43–75)
NITRITE UR QL STRIP: NEGATIVE
NON-SQ EPI CELLS URNS QL MICRO: ABNORMAL /HPF
NONHDLC SERPL-MCNC: 127 MG/DL
NRBC BLD AUTO-RTO: 0 /100 WBCS
PH UR STRIP.AUTO: 5.5 [PH]
PLATELET # BLD AUTO: 231 THOUSANDS/UL (ref 149–390)
PMV BLD AUTO: 11 FL (ref 8.9–12.7)
POTASSIUM SERPL-SCNC: 3.8 MMOL/L (ref 3.5–5.3)
PROT SERPL-MCNC: 7.9 G/DL (ref 6.4–8.2)
PROT UR STRIP-MCNC: NEGATIVE MG/DL
PROTHROMBIN TIME: 13.3 SECONDS (ref 11.6–14.5)
RBC # BLD AUTO: 4.23 MILLION/UL (ref 3.81–5.12)
RBC #/AREA URNS AUTO: ABNORMAL /HPF
SODIUM SERPL-SCNC: 140 MMOL/L (ref 136–145)
SP GR UR STRIP.AUTO: 1.02 (ref 1–1.03)
TRIGL SERPL-MCNC: 92 MG/DL
TSH SERPL DL<=0.05 MIU/L-ACNC: 2.92 UIU/ML (ref 0.36–3.74)
UROBILINOGEN UR QL STRIP.AUTO: 0.2 E.U./DL
WBC # BLD AUTO: 6.56 THOUSAND/UL (ref 4.31–10.16)
WBC #/AREA URNS AUTO: ABNORMAL /HPF

## 2020-10-18 PROCEDURE — 85610 PROTHROMBIN TIME: CPT

## 2020-10-18 PROCEDURE — 82043 UR ALBUMIN QUANTITATIVE: CPT | Performed by: INTERNAL MEDICINE

## 2020-10-18 PROCEDURE — 84443 ASSAY THYROID STIM HORMONE: CPT

## 2020-10-18 PROCEDURE — 80061 LIPID PANEL: CPT

## 2020-10-18 PROCEDURE — 82570 ASSAY OF URINE CREATININE: CPT | Performed by: INTERNAL MEDICINE

## 2020-10-18 PROCEDURE — 82306 VITAMIN D 25 HYDROXY: CPT

## 2020-10-18 PROCEDURE — 83036 HEMOGLOBIN GLYCOSYLATED A1C: CPT

## 2020-10-18 PROCEDURE — 80053 COMPREHEN METABOLIC PANEL: CPT

## 2020-10-18 PROCEDURE — 36415 COLL VENOUS BLD VENIPUNCTURE: CPT

## 2020-10-18 PROCEDURE — 81001 URINALYSIS AUTO W/SCOPE: CPT | Performed by: INTERNAL MEDICINE

## 2020-10-18 PROCEDURE — 85025 COMPLETE CBC W/AUTO DIFF WBC: CPT

## 2020-11-03 RX ORDER — AMOXICILLIN 500 MG
CAPSULE ORAL DAILY
COMMUNITY

## 2020-11-05 ENCOUNTER — TELEPHONE (OUTPATIENT)
Dept: OBGYN CLINIC | Facility: CLINIC | Age: 60
End: 2020-11-05

## 2020-11-08 ENCOUNTER — ANESTHESIA EVENT (OUTPATIENT)
Dept: PERIOP | Facility: HOSPITAL | Age: 60
End: 2020-11-08
Payer: COMMERCIAL

## 2020-11-09 ENCOUNTER — ANESTHESIA (OUTPATIENT)
Dept: PERIOP | Facility: HOSPITAL | Age: 60
End: 2020-11-09
Payer: COMMERCIAL

## 2020-11-09 ENCOUNTER — HOSPITAL ENCOUNTER (OUTPATIENT)
Facility: HOSPITAL | Age: 60
Setting detail: OUTPATIENT SURGERY
Discharge: HOME | End: 2020-11-09
Attending: OBSTETRICS & GYNECOLOGY | Admitting: OBSTETRICS & GYNECOLOGY
Payer: COMMERCIAL

## 2020-11-09 VITALS
SYSTOLIC BLOOD PRESSURE: 141 MMHG | OXYGEN SATURATION: 95 % | HEART RATE: 64 BPM | WEIGHT: 260 LBS | HEIGHT: 67 IN | BODY MASS INDEX: 40.81 KG/M2 | TEMPERATURE: 96.4 F | DIASTOLIC BLOOD PRESSURE: 86 MMHG | RESPIRATION RATE: 18 BRPM

## 2020-11-09 VITALS — HEART RATE: 83 BPM

## 2020-11-09 DIAGNOSIS — G89.18 POSTOPERATIVE PAIN: Primary | ICD-10-CM

## 2020-11-09 DIAGNOSIS — N95.0 POSTMENOPAUSAL BLEEDING: ICD-10-CM

## 2020-11-09 PROBLEM — Z98.890 S/P D&C (STATUS POST DILATION AND CURETTAGE): Status: ACTIVE | Noted: 2020-11-09

## 2020-11-09 PROCEDURE — 88305 TISSUE EXAM BY PATHOLOGIST: CPT | Performed by: PATHOLOGY

## 2020-11-09 PROCEDURE — 58558 HYSTEROSCOPY BIOPSY: CPT | Performed by: OBSTETRICS & GYNECOLOGY

## 2020-11-09 RX ORDER — LIDOCAINE HYDROCHLORIDE 10 MG/ML
INJECTION, SOLUTION EPIDURAL; INFILTRATION; INTRACAUDAL; PERINEURAL AS NEEDED
Status: DISCONTINUED | OUTPATIENT
Start: 2020-11-09 | End: 2020-11-09

## 2020-11-09 RX ORDER — KETOROLAC TROMETHAMINE 30 MG/ML
INJECTION, SOLUTION INTRAMUSCULAR; INTRAVENOUS AS NEEDED
Status: DISCONTINUED | OUTPATIENT
Start: 2020-11-09 | End: 2020-11-09

## 2020-11-09 RX ORDER — ACETAMINOPHEN 325 MG/1
650 TABLET ORAL EVERY 6 HOURS PRN
Refills: 0 | Status: ON HOLD
Start: 2020-11-09 | End: 2021-02-11 | Stop reason: SDUPTHER

## 2020-11-09 RX ORDER — MIDAZOLAM HYDROCHLORIDE 2 MG/2ML
INJECTION, SOLUTION INTRAMUSCULAR; INTRAVENOUS AS NEEDED
Status: DISCONTINUED | OUTPATIENT
Start: 2020-11-09 | End: 2020-11-09

## 2020-11-09 RX ORDER — FENTANYL CITRATE/PF 50 MCG/ML
50 SYRINGE (ML) INJECTION
Status: DISCONTINUED | OUTPATIENT
Start: 2020-11-09 | End: 2020-11-09 | Stop reason: HOSPADM

## 2020-11-09 RX ORDER — ALBUTEROL SULFATE 2.5 MG/3ML
2.5 SOLUTION RESPIRATORY (INHALATION) ONCE AS NEEDED
Status: DISCONTINUED | OUTPATIENT
Start: 2020-11-09 | End: 2020-11-09 | Stop reason: HOSPADM

## 2020-11-09 RX ORDER — DEXAMETHASONE SODIUM PHOSPHATE 10 MG/ML
INJECTION, SOLUTION INTRAMUSCULAR; INTRAVENOUS AS NEEDED
Status: DISCONTINUED | OUTPATIENT
Start: 2020-11-09 | End: 2020-11-09

## 2020-11-09 RX ORDER — ONDANSETRON 2 MG/ML
4 INJECTION INTRAMUSCULAR; INTRAVENOUS ONCE AS NEEDED
Status: DISCONTINUED | OUTPATIENT
Start: 2020-11-09 | End: 2020-11-09 | Stop reason: HOSPADM

## 2020-11-09 RX ORDER — SODIUM CHLORIDE, SODIUM LACTATE, POTASSIUM CHLORIDE, CALCIUM CHLORIDE 600; 310; 30; 20 MG/100ML; MG/100ML; MG/100ML; MG/100ML
INJECTION, SOLUTION INTRAVENOUS CONTINUOUS PRN
Status: DISCONTINUED | OUTPATIENT
Start: 2020-11-09 | End: 2020-11-09

## 2020-11-09 RX ORDER — ONDANSETRON 2 MG/ML
INJECTION INTRAMUSCULAR; INTRAVENOUS AS NEEDED
Status: DISCONTINUED | OUTPATIENT
Start: 2020-11-09 | End: 2020-11-09

## 2020-11-09 RX ORDER — HYDROMORPHONE HCL/PF 1 MG/ML
0.5 SYRINGE (ML) INJECTION
Status: DISCONTINUED | OUTPATIENT
Start: 2020-11-09 | End: 2020-11-09 | Stop reason: HOSPADM

## 2020-11-09 RX ORDER — MAGNESIUM HYDROXIDE 1200 MG/15ML
LIQUID ORAL AS NEEDED
Status: DISCONTINUED | OUTPATIENT
Start: 2020-11-09 | End: 2020-11-09 | Stop reason: HOSPADM

## 2020-11-09 RX ORDER — ACETAMINOPHEN 325 MG/1
650 TABLET ORAL EVERY 6 HOURS PRN
Status: DISCONTINUED | OUTPATIENT
Start: 2020-11-09 | End: 2020-11-09 | Stop reason: HOSPADM

## 2020-11-09 RX ORDER — FENTANYL CITRATE 50 UG/ML
INJECTION, SOLUTION INTRAMUSCULAR; INTRAVENOUS AS NEEDED
Status: DISCONTINUED | OUTPATIENT
Start: 2020-11-09 | End: 2020-11-09

## 2020-11-09 RX ORDER — SODIUM CHLORIDE, SODIUM LACTATE, POTASSIUM CHLORIDE, CALCIUM CHLORIDE 600; 310; 30; 20 MG/100ML; MG/100ML; MG/100ML; MG/100ML
125 INJECTION, SOLUTION INTRAVENOUS CONTINUOUS
Status: DISCONTINUED | OUTPATIENT
Start: 2020-11-09 | End: 2020-11-09 | Stop reason: HOSPADM

## 2020-11-09 RX ORDER — IBUPROFEN 600 MG/1
600 TABLET ORAL EVERY 6 HOURS PRN
Qty: 30 TABLET | Refills: 0
Start: 2020-11-09 | End: 2021-02-09

## 2020-11-09 RX ORDER — PROPOFOL 10 MG/ML
INJECTION, EMULSION INTRAVENOUS AS NEEDED
Status: DISCONTINUED | OUTPATIENT
Start: 2020-11-09 | End: 2020-11-09

## 2020-11-09 RX ORDER — ONDANSETRON 2 MG/ML
4 INJECTION INTRAMUSCULAR; INTRAVENOUS EVERY 6 HOURS PRN
Status: DISCONTINUED | OUTPATIENT
Start: 2020-11-09 | End: 2020-11-09 | Stop reason: HOSPADM

## 2020-11-09 RX ADMIN — KETOROLAC TROMETHAMINE 30 MG: 30 INJECTION, SOLUTION INTRAMUSCULAR at 08:49

## 2020-11-09 RX ADMIN — FENTANYL CITRATE 50 MCG: 50 INJECTION, SOLUTION INTRAMUSCULAR; INTRAVENOUS at 08:51

## 2020-11-09 RX ADMIN — PHENYLEPHRINE HYDROCHLORIDE 100 MCG: 10 INJECTION INTRAVENOUS at 08:28

## 2020-11-09 RX ADMIN — ONDANSETRON 4 MG: 2 INJECTION INTRAMUSCULAR; INTRAVENOUS at 08:10

## 2020-11-09 RX ADMIN — FENTANYL CITRATE 50 MCG: 50 INJECTION, SOLUTION INTRAMUSCULAR; INTRAVENOUS at 08:10

## 2020-11-09 RX ADMIN — LIDOCAINE HYDROCHLORIDE 50 MG: 10 INJECTION, SOLUTION EPIDURAL; INFILTRATION; INTRACAUDAL; PERINEURAL at 08:04

## 2020-11-09 RX ADMIN — MIDAZOLAM 2 MG: 1 INJECTION INTRAMUSCULAR; INTRAVENOUS at 08:00

## 2020-11-09 RX ADMIN — DEXAMETHASONE SODIUM PHOSPHATE 10 MG: 10 INJECTION, SOLUTION INTRAMUSCULAR; INTRAVENOUS at 08:10

## 2020-11-09 RX ADMIN — PHENYLEPHRINE HYDROCHLORIDE 100 MCG: 10 INJECTION INTRAVENOUS at 08:22

## 2020-11-09 RX ADMIN — PHENYLEPHRINE HYDROCHLORIDE 100 MCG: 10 INJECTION INTRAVENOUS at 08:13

## 2020-11-09 RX ADMIN — SODIUM CHLORIDE, SODIUM LACTATE, POTASSIUM CHLORIDE, AND CALCIUM CHLORIDE: .6; .31; .03; .02 INJECTION, SOLUTION INTRAVENOUS at 07:59

## 2020-11-09 RX ADMIN — PROPOFOL 200 MG: 10 INJECTION, EMULSION INTRAVENOUS at 08:04

## 2020-11-17 ENCOUNTER — OFFICE VISIT (OUTPATIENT)
Dept: OBGYN CLINIC | Facility: CLINIC | Age: 60
End: 2020-11-17

## 2020-11-17 VITALS
BODY MASS INDEX: 40.02 KG/M2 | HEIGHT: 67 IN | WEIGHT: 255 LBS | TEMPERATURE: 97 F | SYSTOLIC BLOOD PRESSURE: 126 MMHG | DIASTOLIC BLOOD PRESSURE: 78 MMHG

## 2020-11-17 DIAGNOSIS — N85.02 ENDOMETRIAL INTRAEPITHELIAL NEOPLASIA (EIN): ICD-10-CM

## 2020-11-17 DIAGNOSIS — Z09 POSTOP CHECK: Primary | ICD-10-CM

## 2020-11-17 PROCEDURE — 99024 POSTOP FOLLOW-UP VISIT: CPT | Performed by: OBSTETRICS & GYNECOLOGY

## 2020-11-17 RX ORDER — ASPIRIN 81 MG/1
81 TABLET, CHEWABLE ORAL
COMMUNITY

## 2020-11-18 ENCOUNTER — TELEPHONE (OUTPATIENT)
Dept: SURGICAL ONCOLOGY | Facility: CLINIC | Age: 60
End: 2020-11-18

## 2020-11-23 ENCOUNTER — TELEPHONE (OUTPATIENT)
Dept: SURGICAL ONCOLOGY | Facility: CLINIC | Age: 60
End: 2020-11-23

## 2020-11-24 PROBLEM — C50.919 BREAST CANCER (HCC): Status: ACTIVE | Noted: 2020-11-24

## 2020-11-24 PROBLEM — I10 HTN (HYPERTENSION): Status: ACTIVE | Noted: 2020-11-24

## 2020-11-24 PROBLEM — N85.02 EIN (ENDOMETRIAL INTRAEPITHELIAL NEOPLASIA): Status: ACTIVE | Noted: 2020-11-09

## 2020-11-25 ENCOUNTER — CONSULT (OUTPATIENT)
Dept: GYNECOLOGIC ONCOLOGY | Facility: CLINIC | Age: 60
End: 2020-11-25
Payer: COMMERCIAL

## 2020-11-25 VITALS
RESPIRATION RATE: 19 BRPM | DIASTOLIC BLOOD PRESSURE: 82 MMHG | SYSTOLIC BLOOD PRESSURE: 150 MMHG | BODY MASS INDEX: 39.77 KG/M2 | HEART RATE: 86 BPM | WEIGHT: 253.4 LBS | TEMPERATURE: 98.1 F | HEIGHT: 67 IN

## 2020-11-25 DIAGNOSIS — I10 HYPERTENSION, UNSPECIFIED TYPE: ICD-10-CM

## 2020-11-25 DIAGNOSIS — C50.919 MALIGNANT NEOPLASM OF FEMALE BREAST, UNSPECIFIED ESTROGEN RECEPTOR STATUS, UNSPECIFIED LATERALITY, UNSPECIFIED SITE OF BREAST (HCC): ICD-10-CM

## 2020-11-25 DIAGNOSIS — N85.02 EIN (ENDOMETRIAL INTRAEPITHELIAL NEOPLASIA): ICD-10-CM

## 2020-11-25 DIAGNOSIS — N85.02 ENDOMETRIAL INTRAEPITHELIAL NEOPLASIA (EIN): Primary | ICD-10-CM

## 2020-11-25 PROCEDURE — 99203 OFFICE O/P NEW LOW 30 MIN: CPT | Performed by: OBSTETRICS & GYNECOLOGY

## 2020-11-25 RX ORDER — GABAPENTIN 100 MG/1
100 CAPSULE ORAL ONCE
Status: CANCELLED | OUTPATIENT
Start: 2020-11-25 | End: 2020-11-25

## 2020-11-25 RX ORDER — CEFAZOLIN SODIUM 2 G/50ML
2000 SOLUTION INTRAVENOUS ONCE
Status: CANCELLED | OUTPATIENT
Start: 2020-11-25 | End: 2020-11-25

## 2020-11-25 RX ORDER — HEPARIN SODIUM 5000 [USP'U]/ML
5000 INJECTION, SOLUTION INTRAVENOUS; SUBCUTANEOUS
Status: CANCELLED | OUTPATIENT
Start: 2020-11-25 | End: 2020-11-26

## 2020-12-30 DIAGNOSIS — N85.02 ENDOMETRIAL INTRAEPITHELIAL NEOPLASIA (EIN): Primary | ICD-10-CM

## 2020-12-30 RX ORDER — MEGESTROL ACETATE 40 MG/1
40 TABLET ORAL 2 TIMES DAILY
Qty: 60 TABLET | Refills: 2 | Status: ON HOLD | OUTPATIENT
Start: 2020-12-30 | End: 2021-02-11

## 2021-01-20 ENCOUNTER — APPOINTMENT (OUTPATIENT)
Dept: RADIOLOGY | Age: 61
End: 2021-01-20
Payer: COMMERCIAL

## 2021-01-20 ENCOUNTER — LAB (OUTPATIENT)
Dept: LAB | Age: 61
End: 2021-01-20
Payer: COMMERCIAL

## 2021-01-20 ENCOUNTER — OFFICE VISIT (OUTPATIENT)
Dept: LAB | Age: 61
End: 2021-01-20
Payer: COMMERCIAL

## 2021-01-20 DIAGNOSIS — N85.02 ENDOMETRIAL INTRAEPITHELIAL NEOPLASIA (EIN): ICD-10-CM

## 2021-01-20 DIAGNOSIS — N85.02 ENDOMETRIAL INTRAEPITHELIAL NEOPLASIA (EIN): Primary | ICD-10-CM

## 2021-01-20 LAB
ABO GROUP BLD: NORMAL
ALBUMIN SERPL BCP-MCNC: 3.8 G/DL (ref 3.5–5)
ALP SERPL-CCNC: 92 U/L (ref 46–116)
ALT SERPL W P-5'-P-CCNC: 21 U/L (ref 12–78)
ANION GAP SERPL CALCULATED.3IONS-SCNC: 8 MMOL/L (ref 4–13)
AST SERPL W P-5'-P-CCNC: 14 U/L (ref 5–45)
BILIRUB SERPL-MCNC: 0.43 MG/DL (ref 0.2–1)
BLD GP AB SCN SERPL QL: NEGATIVE
BUN SERPL-MCNC: 16 MG/DL (ref 5–25)
CALCIUM SERPL-MCNC: 9.3 MG/DL (ref 8.3–10.1)
CHLORIDE SERPL-SCNC: 110 MMOL/L (ref 100–108)
CO2 SERPL-SCNC: 24 MMOL/L (ref 21–32)
CREAT SERPL-MCNC: 0.92 MG/DL (ref 0.6–1.3)
ERYTHROCYTE [DISTWIDTH] IN BLOOD BY AUTOMATED COUNT: 12.2 % (ref 11.6–15.1)
EST. AVERAGE GLUCOSE BLD GHB EST-MCNC: 103 MG/DL
GFR SERPL CREATININE-BSD FRML MDRD: 68 ML/MIN/1.73SQ M
GLUCOSE P FAST SERPL-MCNC: 92 MG/DL (ref 65–99)
HBA1C MFR BLD: 5.2 %
HCT VFR BLD AUTO: 40.7 % (ref 34.8–46.1)
HGB BLD-MCNC: 13.6 G/DL (ref 11.5–15.4)
MCH RBC QN AUTO: 31.6 PG (ref 26.8–34.3)
MCHC RBC AUTO-ENTMCNC: 33.4 G/DL (ref 31.4–37.4)
MCV RBC AUTO: 95 FL (ref 82–98)
PLATELET # BLD AUTO: 256 THOUSANDS/UL (ref 149–390)
PMV BLD AUTO: 11.7 FL (ref 8.9–12.7)
POTASSIUM SERPL-SCNC: 3.9 MMOL/L (ref 3.5–5.3)
PROT SERPL-MCNC: 7.9 G/DL (ref 6.4–8.2)
RBC # BLD AUTO: 4.3 MILLION/UL (ref 3.81–5.12)
RH BLD: POSITIVE
SODIUM SERPL-SCNC: 142 MMOL/L (ref 136–145)
SPECIMEN EXPIRATION DATE: NORMAL
WBC # BLD AUTO: 7.7 THOUSAND/UL (ref 4.31–10.16)

## 2021-01-20 PROCEDURE — 93005 ELECTROCARDIOGRAM TRACING: CPT

## 2021-01-20 PROCEDURE — 83036 HEMOGLOBIN GLYCOSYLATED A1C: CPT

## 2021-01-20 PROCEDURE — 36415 COLL VENOUS BLD VENIPUNCTURE: CPT

## 2021-01-20 PROCEDURE — 86850 RBC ANTIBODY SCREEN: CPT

## 2021-01-20 PROCEDURE — 71046 X-RAY EXAM CHEST 2 VIEWS: CPT

## 2021-01-20 PROCEDURE — 85027 COMPLETE CBC AUTOMATED: CPT

## 2021-01-20 PROCEDURE — 80053 COMPREHEN METABOLIC PANEL: CPT

## 2021-01-20 PROCEDURE — 86901 BLOOD TYPING SEROLOGIC RH(D): CPT

## 2021-01-20 PROCEDURE — 86900 BLOOD TYPING SEROLOGIC ABO: CPT

## 2021-01-22 LAB
ATRIAL RATE: 74 BPM
P AXIS: 28 DEGREES
PR INTERVAL: 170 MS
QRS AXIS: 2 DEGREES
QRSD INTERVAL: 86 MS
QT INTERVAL: 380 MS
QTC INTERVAL: 421 MS
T WAVE AXIS: 33 DEGREES
VENTRICULAR RATE: 74 BPM

## 2021-01-22 PROCEDURE — 93010 ELECTROCARDIOGRAM REPORT: CPT | Performed by: INTERNAL MEDICINE

## 2021-02-09 NOTE — PRE-PROCEDURE INSTRUCTIONS
Pre-Surgery Instructions:    Pre procedure instructions given, verbalizes understanding reviewed on 2/9 with understanding    Have you had / have a sore throat? no  have you had / have a cough less than 1 week? no  Have you had / have a fever greater than 100 0 - 100  4? no  Are you experiencing any shortness of breath? no       Medication Instructions    carvedilol (COREG CR) 20 MG 24 hr capsule Instructed patient per Anesthesia Guidelines  taking  On 2/11

## 2021-02-10 ENCOUNTER — ANESTHESIA EVENT (OUTPATIENT)
Dept: PERIOP | Facility: HOSPITAL | Age: 61
End: 2021-02-10
Payer: COMMERCIAL

## 2021-02-10 PROBLEM — E66.9 OBESITY (BMI 30-39.9): Status: ACTIVE | Noted: 2021-02-10

## 2021-02-10 RX ORDER — HYDROMORPHONE HCL/PF 1 MG/ML
0.25 SYRINGE (ML) INJECTION
Status: CANCELLED | OUTPATIENT
Start: 2021-02-10

## 2021-02-10 RX ORDER — FENTANYL CITRATE/PF 50 MCG/ML
50 SYRINGE (ML) INJECTION
Status: CANCELLED | OUTPATIENT
Start: 2021-02-10

## 2021-02-10 RX ORDER — SODIUM CHLORIDE, SODIUM LACTATE, POTASSIUM CHLORIDE, CALCIUM CHLORIDE 600; 310; 30; 20 MG/100ML; MG/100ML; MG/100ML; MG/100ML
75 INJECTION, SOLUTION INTRAVENOUS CONTINUOUS
Status: CANCELLED | OUTPATIENT
Start: 2021-02-10

## 2021-02-10 RX ORDER — ONDANSETRON 2 MG/ML
4 INJECTION INTRAMUSCULAR; INTRAVENOUS ONCE AS NEEDED
Status: CANCELLED | OUTPATIENT
Start: 2021-02-10

## 2021-02-11 ENCOUNTER — HOSPITAL ENCOUNTER (OUTPATIENT)
Facility: HOSPITAL | Age: 61
Setting detail: OUTPATIENT SURGERY
Discharge: HOME | End: 2021-02-11
Attending: OBSTETRICS & GYNECOLOGY | Admitting: OBSTETRICS & GYNECOLOGY
Payer: COMMERCIAL

## 2021-02-11 ENCOUNTER — ANESTHESIA (OUTPATIENT)
Dept: PERIOP | Facility: HOSPITAL | Age: 61
End: 2021-02-11
Payer: COMMERCIAL

## 2021-02-11 VITALS
WEIGHT: 250 LBS | BODY MASS INDEX: 39.24 KG/M2 | SYSTOLIC BLOOD PRESSURE: 166 MMHG | HEIGHT: 67 IN | TEMPERATURE: 98 F | OXYGEN SATURATION: 95 % | HEART RATE: 75 BPM | DIASTOLIC BLOOD PRESSURE: 84 MMHG | RESPIRATION RATE: 16 BRPM

## 2021-02-11 VITALS — HEART RATE: 78 BPM

## 2021-02-11 DIAGNOSIS — N85.02 ENDOMETRIAL INTRAEPITHELIAL NEOPLASIA (EIN): ICD-10-CM

## 2021-02-11 DIAGNOSIS — N85.02 EIN (ENDOMETRIAL INTRAEPITHELIAL NEOPLASIA): Primary | ICD-10-CM

## 2021-02-11 DIAGNOSIS — G89.18 POSTOPERATIVE PAIN: ICD-10-CM

## 2021-02-11 LAB
ABO GROUP BLD: NORMAL
RH BLD: POSITIVE

## 2021-02-11 PROCEDURE — 88307 TISSUE EXAM BY PATHOLOGIST: CPT | Performed by: PATHOLOGY

## 2021-02-11 PROCEDURE — 88342 IMHCHEM/IMCYTCHM 1ST ANTB: CPT | Performed by: PATHOLOGY

## 2021-02-11 PROCEDURE — 88341 IMHCHEM/IMCYTCHM EA ADD ANTB: CPT | Performed by: PATHOLOGY

## 2021-02-11 PROCEDURE — 88309 TISSUE EXAM BY PATHOLOGIST: CPT | Performed by: PATHOLOGY

## 2021-02-11 PROCEDURE — NC001 PR NO CHARGE: Performed by: OBSTETRICS & GYNECOLOGY

## 2021-02-11 PROCEDURE — 58571 TLH W/T/O 250 G OR LESS: CPT | Performed by: OBSTETRICS & GYNECOLOGY

## 2021-02-11 PROCEDURE — 88112 CYTOPATH CELL ENHANCE TECH: CPT | Performed by: PATHOLOGY

## 2021-02-11 PROCEDURE — 38900 IO MAP OF SENT LYMPH NODE: CPT | Performed by: OBSTETRICS & GYNECOLOGY

## 2021-02-11 PROCEDURE — 38570 LAPAROSCOPY LYMPH NODE BIOP: CPT | Performed by: OBSTETRICS & GYNECOLOGY

## 2021-02-11 RX ORDER — OXYCODONE HYDROCHLORIDE 5 MG/1
5 TABLET ORAL EVERY 4 HOURS PRN
Qty: 15 TABLET | Refills: 0 | Status: SHIPPED | OUTPATIENT
Start: 2021-02-11 | End: 2021-02-21

## 2021-02-11 RX ORDER — SODIUM CHLORIDE, SODIUM LACTATE, POTASSIUM CHLORIDE, CALCIUM CHLORIDE 600; 310; 30; 20 MG/100ML; MG/100ML; MG/100ML; MG/100ML
100 INJECTION, SOLUTION INTRAVENOUS CONTINUOUS
Status: DISCONTINUED | OUTPATIENT
Start: 2021-02-11 | End: 2021-02-11

## 2021-02-11 RX ORDER — ACETAMINOPHEN 325 MG/1
650 TABLET ORAL EVERY 6 HOURS PRN
Status: DISCONTINUED | OUTPATIENT
Start: 2021-02-11 | End: 2021-02-11 | Stop reason: HOSPADM

## 2021-02-11 RX ORDER — LIDOCAINE HYDROCHLORIDE 10 MG/ML
0.5 INJECTION, SOLUTION EPIDURAL; INFILTRATION; INTRACAUDAL; PERINEURAL ONCE AS NEEDED
Status: COMPLETED | OUTPATIENT
Start: 2021-02-11 | End: 2021-02-11

## 2021-02-11 RX ORDER — GLYCOPYRROLATE 0.2 MG/ML
INJECTION INTRAMUSCULAR; INTRAVENOUS AS NEEDED
Status: DISCONTINUED | OUTPATIENT
Start: 2021-02-11 | End: 2021-02-11

## 2021-02-11 RX ORDER — OXYCODONE HYDROCHLORIDE 5 MG/1
5 TABLET ORAL EVERY 4 HOURS PRN
Status: DISCONTINUED | OUTPATIENT
Start: 2021-02-11 | End: 2021-02-11 | Stop reason: HOSPADM

## 2021-02-11 RX ORDER — KETOROLAC TROMETHAMINE 30 MG/ML
INJECTION, SOLUTION INTRAMUSCULAR; INTRAVENOUS AS NEEDED
Status: DISCONTINUED | OUTPATIENT
Start: 2021-02-11 | End: 2021-02-11

## 2021-02-11 RX ORDER — HYDROMORPHONE HCL/PF 1 MG/ML
0.5 SYRINGE (ML) INJECTION
Status: DISCONTINUED | OUTPATIENT
Start: 2021-02-11 | End: 2021-02-11 | Stop reason: HOSPADM

## 2021-02-11 RX ORDER — MAGNESIUM HYDROXIDE 1200 MG/15ML
LIQUID ORAL AS NEEDED
Status: DISCONTINUED | OUTPATIENT
Start: 2021-02-11 | End: 2021-02-11 | Stop reason: HOSPADM

## 2021-02-11 RX ORDER — METOCLOPRAMIDE HYDROCHLORIDE 5 MG/ML
10 INJECTION INTRAMUSCULAR; INTRAVENOUS ONCE AS NEEDED
Status: DISCONTINUED | OUTPATIENT
Start: 2021-02-11 | End: 2021-02-11 | Stop reason: HOSPADM

## 2021-02-11 RX ORDER — ONDANSETRON 2 MG/ML
4 INJECTION INTRAMUSCULAR; INTRAVENOUS EVERY 6 HOURS PRN
Status: DISCONTINUED | OUTPATIENT
Start: 2021-02-11 | End: 2021-02-11 | Stop reason: HOSPADM

## 2021-02-11 RX ORDER — PROPOFOL 10 MG/ML
INJECTION, EMULSION INTRAVENOUS AS NEEDED
Status: DISCONTINUED | OUTPATIENT
Start: 2021-02-11 | End: 2021-02-11

## 2021-02-11 RX ORDER — ONDANSETRON 2 MG/ML
INJECTION INTRAMUSCULAR; INTRAVENOUS AS NEEDED
Status: DISCONTINUED | OUTPATIENT
Start: 2021-02-11 | End: 2021-02-11

## 2021-02-11 RX ORDER — DEXMEDETOMIDINE HYDROCHLORIDE 100 UG/ML
INJECTION, SOLUTION INTRAVENOUS AS NEEDED
Status: DISCONTINUED | OUTPATIENT
Start: 2021-02-11 | End: 2021-02-11

## 2021-02-11 RX ORDER — ROCURONIUM BROMIDE 10 MG/ML
INJECTION, SOLUTION INTRAVENOUS AS NEEDED
Status: DISCONTINUED | OUTPATIENT
Start: 2021-02-11 | End: 2021-02-11

## 2021-02-11 RX ORDER — DEXAMETHASONE SODIUM PHOSPHATE 10 MG/ML
INJECTION, SOLUTION INTRAMUSCULAR; INTRAVENOUS AS NEEDED
Status: DISCONTINUED | OUTPATIENT
Start: 2021-02-11 | End: 2021-02-11

## 2021-02-11 RX ORDER — ACETAMINOPHEN 325 MG/1
650 TABLET ORAL EVERY 6 HOURS PRN
Qty: 60 TABLET | Refills: 1 | Status: SHIPPED | OUTPATIENT
Start: 2021-02-11 | End: 2021-06-09 | Stop reason: ALTCHOICE

## 2021-02-11 RX ORDER — INDOCYANINE GREEN AND WATER 25 MG
KIT INJECTION AS NEEDED
Status: DISCONTINUED | OUTPATIENT
Start: 2021-02-11 | End: 2021-02-11 | Stop reason: HOSPADM

## 2021-02-11 RX ORDER — ALBUTEROL SULFATE 2.5 MG/3ML
2.5 SOLUTION RESPIRATORY (INHALATION) ONCE AS NEEDED
Status: DISCONTINUED | OUTPATIENT
Start: 2021-02-11 | End: 2021-02-11 | Stop reason: HOSPADM

## 2021-02-11 RX ORDER — HEPARIN SODIUM 5000 [USP'U]/ML
5000 INJECTION, SOLUTION INTRAVENOUS; SUBCUTANEOUS
Status: COMPLETED | OUTPATIENT
Start: 2021-02-11 | End: 2021-02-11

## 2021-02-11 RX ORDER — FENTANYL CITRATE 50 UG/ML
INJECTION, SOLUTION INTRAMUSCULAR; INTRAVENOUS AS NEEDED
Status: DISCONTINUED | OUTPATIENT
Start: 2021-02-11 | End: 2021-02-11

## 2021-02-11 RX ORDER — HYDROMORPHONE HCL 110MG/55ML
PATIENT CONTROLLED ANALGESIA SYRINGE INTRAVENOUS AS NEEDED
Status: DISCONTINUED | OUTPATIENT
Start: 2021-02-11 | End: 2021-02-11

## 2021-02-11 RX ORDER — CEFAZOLIN SODIUM 2 G/50ML
2000 SOLUTION INTRAVENOUS ONCE
Status: COMPLETED | OUTPATIENT
Start: 2021-02-11 | End: 2021-02-11

## 2021-02-11 RX ORDER — LIDOCAINE HYDROCHLORIDE 10 MG/ML
INJECTION, SOLUTION EPIDURAL; INFILTRATION; INTRACAUDAL; PERINEURAL AS NEEDED
Status: DISCONTINUED | OUTPATIENT
Start: 2021-02-11 | End: 2021-02-11

## 2021-02-11 RX ORDER — IBUPROFEN 600 MG/1
600 TABLET ORAL EVERY 6 HOURS PRN
Status: DISCONTINUED | OUTPATIENT
Start: 2021-02-11 | End: 2021-02-11 | Stop reason: HOSPADM

## 2021-02-11 RX ORDER — FENTANYL CITRATE/PF 50 MCG/ML
50 SYRINGE (ML) INJECTION
Status: COMPLETED | OUTPATIENT
Start: 2021-02-11 | End: 2021-02-11

## 2021-02-11 RX ORDER — NEOSTIGMINE METHYLSULFATE 1 MG/ML
INJECTION INTRAVENOUS AS NEEDED
Status: DISCONTINUED | OUTPATIENT
Start: 2021-02-11 | End: 2021-02-11

## 2021-02-11 RX ORDER — SODIUM CHLORIDE 9 MG/ML
INJECTION, SOLUTION INTRAVENOUS CONTINUOUS PRN
Status: DISCONTINUED | OUTPATIENT
Start: 2021-02-11 | End: 2021-02-11

## 2021-02-11 RX ORDER — ONDANSETRON 2 MG/ML
4 INJECTION INTRAMUSCULAR; INTRAVENOUS ONCE AS NEEDED
Status: DISCONTINUED | OUTPATIENT
Start: 2021-02-11 | End: 2021-02-11 | Stop reason: HOSPADM

## 2021-02-11 RX ORDER — SODIUM CHLORIDE, SODIUM LACTATE, POTASSIUM CHLORIDE, CALCIUM CHLORIDE 600; 310; 30; 20 MG/100ML; MG/100ML; MG/100ML; MG/100ML
INJECTION, SOLUTION INTRAVENOUS CONTINUOUS PRN
Status: DISCONTINUED | OUTPATIENT
Start: 2021-02-11 | End: 2021-02-11

## 2021-02-11 RX ORDER — SUCCINYLCHOLINE/SOD CL,ISO/PF 100 MG/5ML
SYRINGE (ML) INTRAVENOUS AS NEEDED
Status: DISCONTINUED | OUTPATIENT
Start: 2021-02-11 | End: 2021-02-11

## 2021-02-11 RX ORDER — GABAPENTIN 100 MG/1
100 CAPSULE ORAL ONCE
Status: COMPLETED | OUTPATIENT
Start: 2021-02-11 | End: 2021-02-11

## 2021-02-11 RX ORDER — MIDAZOLAM HYDROCHLORIDE 2 MG/2ML
INJECTION, SOLUTION INTRAMUSCULAR; INTRAVENOUS AS NEEDED
Status: DISCONTINUED | OUTPATIENT
Start: 2021-02-11 | End: 2021-02-11

## 2021-02-11 RX ORDER — ALBUMIN, HUMAN INJ 5% 5 %
SOLUTION INTRAVENOUS CONTINUOUS PRN
Status: DISCONTINUED | OUTPATIENT
Start: 2021-02-11 | End: 2021-02-11

## 2021-02-11 RX ORDER — SODIUM CHLORIDE, SODIUM LACTATE, POTASSIUM CHLORIDE, CALCIUM CHLORIDE 600; 310; 30; 20 MG/100ML; MG/100ML; MG/100ML; MG/100ML
100 INJECTION, SOLUTION INTRAVENOUS CONTINUOUS
Status: DISCONTINUED | OUTPATIENT
Start: 2021-02-11 | End: 2021-02-11 | Stop reason: HOSPADM

## 2021-02-11 RX ORDER — BUPIVACAINE HYDROCHLORIDE 5 MG/ML
INJECTION, SOLUTION EPIDURAL; INTRACAUDAL AS NEEDED
Status: DISCONTINUED | OUTPATIENT
Start: 2021-02-11 | End: 2021-02-11 | Stop reason: HOSPADM

## 2021-02-11 RX ADMIN — DEXMEDETOMIDINE HCL 8 MCG: 100 INJECTION INTRAVENOUS at 16:02

## 2021-02-11 RX ADMIN — NEOSTIGMINE METHYLSULFATE 3 MG: 1 INJECTION INTRAVENOUS at 15:57

## 2021-02-11 RX ADMIN — HEPARIN SODIUM 5000 UNITS: 5000 INJECTION INTRAVENOUS; SUBCUTANEOUS at 11:28

## 2021-02-11 RX ADMIN — PROPOFOL 200 MG: 10 INJECTION, EMULSION INTRAVENOUS at 14:14

## 2021-02-11 RX ADMIN — DEXMEDETOMIDINE HCL 8 MCG: 100 INJECTION INTRAVENOUS at 14:43

## 2021-02-11 RX ADMIN — FENTANYL CITRATE 100 MCG: 50 INJECTION INTRAMUSCULAR; INTRAVENOUS at 14:14

## 2021-02-11 RX ADMIN — ROCURONIUM BROMIDE 20 MG: 50 INJECTION, SOLUTION INTRAVENOUS at 15:03

## 2021-02-11 RX ADMIN — DEXMEDETOMIDINE HCL 4 MCG: 100 INJECTION INTRAVENOUS at 15:56

## 2021-02-11 RX ADMIN — LIDOCAINE HYDROCHLORIDE 0.5 ML: 10 INJECTION, SOLUTION EPIDURAL; INFILTRATION; INTRACAUDAL; PERINEURAL at 11:52

## 2021-02-11 RX ADMIN — ONDANSETRON 4 MG: 2 INJECTION INTRAMUSCULAR; INTRAVENOUS at 15:52

## 2021-02-11 RX ADMIN — SODIUM CHLORIDE: 0.9 INJECTION, SOLUTION INTRAVENOUS at 14:20

## 2021-02-11 RX ADMIN — HYDROMORPHONE HYDROCHLORIDE 0.5 MG: 2 INJECTION, SOLUTION INTRAMUSCULAR; INTRAVENOUS; SUBCUTANEOUS at 15:16

## 2021-02-11 RX ADMIN — GLYCOPYRROLATE 0.4 MG: 0.2 INJECTION, SOLUTION INTRAMUSCULAR; INTRAVENOUS at 15:57

## 2021-02-11 RX ADMIN — PHENYLEPHRINE HYDROCHLORIDE 30 MCG/MIN: 10 INJECTION INTRAVENOUS at 14:34

## 2021-02-11 RX ADMIN — HYDROMORPHONE HYDROCHLORIDE 0.5 MG: 2 INJECTION, SOLUTION INTRAMUSCULAR; INTRAVENOUS; SUBCUTANEOUS at 16:12

## 2021-02-11 RX ADMIN — SODIUM CHLORIDE, SODIUM LACTATE, POTASSIUM CHLORIDE, AND CALCIUM CHLORIDE: .6; .31; .03; .02 INJECTION, SOLUTION INTRAVENOUS at 14:03

## 2021-02-11 RX ADMIN — MIDAZOLAM 2 MG: 1 INJECTION INTRAMUSCULAR; INTRAVENOUS at 14:04

## 2021-02-11 RX ADMIN — Medication 50 MCG: at 17:14

## 2021-02-11 RX ADMIN — ROCURONIUM BROMIDE 30 MG: 50 INJECTION, SOLUTION INTRAVENOUS at 14:24

## 2021-02-11 RX ADMIN — PROPOFOL 50 MG: 10 INJECTION, EMULSION INTRAVENOUS at 14:24

## 2021-02-11 RX ADMIN — PHENYLEPHRINE HYDROCHLORIDE 100 MCG: 10 INJECTION INTRAVENOUS at 14:20

## 2021-02-11 RX ADMIN — Medication 50 MCG: at 16:55

## 2021-02-11 RX ADMIN — PHENYLEPHRINE HYDROCHLORIDE 100 MCG: 10 INJECTION INTRAVENOUS at 16:06

## 2021-02-11 RX ADMIN — CEFAZOLIN SODIUM 2000 MG: 2 SOLUTION INTRAVENOUS at 14:18

## 2021-02-11 RX ADMIN — PHENYLEPHRINE HYDROCHLORIDE 200 MCG: 10 INJECTION INTRAVENOUS at 14:34

## 2021-02-11 RX ADMIN — GABAPENTIN 100 MG: 100 CAPSULE ORAL at 11:28

## 2021-02-11 RX ADMIN — KETOROLAC TROMETHAMINE 30 MG: 30 INJECTION, SOLUTION INTRAMUSCULAR at 15:57

## 2021-02-11 RX ADMIN — DEXAMETHASONE SODIUM PHOSPHATE 10 MG: 10 INJECTION, SOLUTION INTRAMUSCULAR; INTRAVENOUS at 14:45

## 2021-02-11 RX ADMIN — LIDOCAINE HYDROCHLORIDE 100 MG: 10 INJECTION, SOLUTION EPIDURAL; INFILTRATION; INTRACAUDAL; PERINEURAL at 14:14

## 2021-02-11 RX ADMIN — SODIUM CHLORIDE, SODIUM LACTATE, POTASSIUM CHLORIDE, AND CALCIUM CHLORIDE 100 ML/HR: .6; .31; .03; .02 INJECTION, SOLUTION INTRAVENOUS at 11:52

## 2021-02-11 RX ADMIN — Medication 120 MG: at 14:14

## 2021-02-11 RX ADMIN — ALBUMIN (HUMAN): 12.5 INJECTION, SOLUTION INTRAVENOUS at 14:52

## 2021-02-11 NOTE — H&P
H&P Exam - Gynecologic Oncology   Particia Scheuermann 61 y o  female MRN: [id number]  Unit/Bed#: OR POOL Encounter: [id number]    Assessment/Plan     Assessment:  Principal Problem:    EIN (endometrial intraepithelial neoplasia)  Active Problems:    HTN (hypertension)    Obesity (BMI 30-39  9)        Plan:  Proceed to OR as planned     History of Present Illness   HPI:  Particia Scheuermann is a 61 y o  female who presents  For surgical intervention of her EIN diagnosis  Pt with minimal vaginal bleeding and no complaints today  Review of Systems   Constitutional: Negative for appetite change, chills, fatigue and fever  Respiratory: Negative for chest tightness and shortness of breath  Gastrointestinal: Negative for abdominal distention, abdominal pain, constipation, diarrhea and nausea  Genitourinary: Negative for difficulty urinating, flank pain, frequency, urgency, vaginal bleeding, vaginal discharge and vaginal pain  Musculoskeletal: Negative for back pain, joint swelling and myalgias  Skin: Negative for rash  Neurological: Negative for dizziness, light-headedness, numbness and headaches         Historical Information   Previous Oncology History:   Past Medical History:   Diagnosis Date    [facility] in female ([facility] [address] )     Heart murmur     History of urethral narrowing     Hypertension     Kidney stones      Past Surgical History:   Procedure Laterality Date    BREAST BIOPSY Right     BREAST LUMPECTOMY Right     [place] HYSTEROSCOPY,RMV MYOMA N/A 11/9/2020    Procedure: HYSTEROSCOPY WITH  RESECTION ENDOMETRIAL POLYP;  Surgeon: Lino Rapp DO;  Location: [facility];  Service: Gynecology    [place] HYSTEROSCOPY,W/ENDO BX N/A 11/9/2020    Procedure: DILATATION AND CURETTAGE (D&C) WITH HYSTEROSCOPY Ciara Thomas;  Surgeon: Lino Rapp DO;  Location: [facility];  Service: Gynecology    REMOVAL VENOUS PORT (PORT-A-CATH)       OB/GYN History:   Family History   Problem Relation Age of Onset    Skin cancer Mother     Anemia Mother     Leukemia Mother     Heart failure Father     Ovarian cancer Maternal Grandmother     Colon cancer Maternal Uncle      Social History   Social History     Substance and Sexual Activity   Alcohol Use Not Currently     Social History     Substance and Sexual Activity   Drug Use Not Currently     Social History     Tobacco Use   Smoking Status Never Smoker   Smokeless Tobacco Never Used       Meds/Allergies   all current active meds have been reviewed  Allergies   Allergen Reactions    Acetaminophen Tachycardia     Tylenol PM Extra Strength ; Tachycardia     Latex Blisters and Rash     Latex Exam Gloves ; Blisters     Tylenol Pm Extra [Diphenhydramine-Apap (Sleep)] Palpitations     Pt states can take tylenol       Objective   Vitals: Blood pressure 150/100, pulse 97, temperature 98 9 °F (37 2 °C), temperature source Temporal, resp  rate 18, height 5' 7" (1 702 m), weight 113 kg (250 lb), SpO2 96 %  No intake or output data in the 24 hours ending 02/11/21 1246    Invasive Devices:   Peripheral IV 02/11/21 Left Wrist (Active)   Site Assessment Clean;Dry; Intact 02/11/21 1151   Dressing Type Transparent 02/11/21 1151   Line Status Blood return noted; Infusing 02/11/21 1151   Dressing Status Clean;Dry; Intact 02/11/21 1151       Physical Exam  HENT:      Head: Normocephalic and atraumatic  Neck:      Musculoskeletal: Normal range of motion  Cardiovascular:      Rate and Rhythm: Normal rate and regular rhythm  Pulmonary:      Effort: Pulmonary effort is normal    Abdominal:      Palpations: Abdomen is soft  Genitourinary:     Comments: defer    Musculoskeletal: Normal range of motion  Skin:     General: Skin is warm and dry  Neurological:      Mental Status: She is alert and oriented to person, place, and time           Lab Results:   Admission on 02/11/2021   Component Date Value    ABO Grouping 02/11/2021 B     Rh Factor 02/11/2021 Positive      Imaging: Xr Chest Pa & Lateral    Result Date: 1/21/2021  Narrative: CHEST INDICATION:   N85 02: Endometrial intraepithelial neoplasia (EIN)  COMPARISON:  4/30/2013 EXAM PERFORMED/VIEWS:  XR CHEST PA & LATERAL  The frontal view was performed utilizing dual energy radiographic technique  FINDINGS: Cardiomediastinal silhouette appears unremarkable  The lungs are clear  Right axillary surgical clips again identified  No pneumothorax or pleural effusion  Osseous structures appear within normal limits for patient age  Impression: No acute cardiopulmonary disease  Workstation performed: [id number]                      I have personally reviewed pertinent reports  EKG, Pathology, and Other Studies: I have personally reviewed pertinent reports        Code Status: No Order  Advance Directive and Living Will:      Power of :    POLST:

## 2021-02-11 NOTE — ANESTHESIA POSTPROCEDURE EVALUATION
Post-Op Assessment Note    CV Status:  Stable  Pain Score: 0    Pain management: adequate     Mental Status:  Awake and somnolent   Hydration Status:  Stable   PONV Controlled:  None   Airway Patency:  Patent      Post Op Vitals Reviewed: Yes      Staff: CRNA, Anesthesiologist   Comments: Patient AAO in PACU, no c/o pain or nausea  Airway patent, VSS  No complications documented      /65 (02/11/21 1630)    Temp 98 3 °F (36 8 °C) (02/11/21 1626)    Pulse 80 (02/11/21 1630)   Resp 16 (02/11/21 1630)    SpO2 96 % (02/11/21 1630)

## 2021-02-11 NOTE — DISCHARGE INSTRUCTIONS
[facility]  [facility] [place]  [phone]    Hysterectomy Discharge Instructions    WHAT YOU NEED TO KNOW:   A hysterectomy is surgery to remove your uterus  Your ovaries, fallopian tubes, cervix, or part of your vagina may also need to be removed  The organs and tissue that will be removed depends on your medical condition  After a hysterectomy, you will not be able to become pregnant  If your ovaries are removed, you will go through menopause if you have not already  DISCHARGE INSTRUCTIONS:   Contact your doctor at the number above if:   · You have a fever over 101o  · You have nausea or are vomiting that does not improve after a light meal    · Your pain is getting worse, even after you take medicine  · You feel pain or burning when you urinate, or you have trouble urinating  · You have pus or a foul-smelling odor coming from your vagina  · Your wound is red, swollen, or draining pus  · You see new or an increased amount of bright red blood coming from your vagina or your incisions  · You have questions or concerns about your condition or care  Seek care immediately:   · Your arm or leg feels warm, tender, and painful  It may look swollen and red  · You have increasing abdominal or pelvic pain  · You have heavy vaginal bleeding that fills 1 or more sanitary pads in 1 hour  Call 911 for any of the following:   · You feel lightheaded, short of breath, and have chest pain  · You cough up blood  Medicines: You may need any of the following:  · Prescription medicine may be given  You may receive a prescription for pain medication or be advised to use over the counter (OTC) pain medication such as acetaminophen (Tylenol) or ibuprofen (Advil, Motrin)  Ask your healthcare provider how to take this medicine safely  · NSAIDs , such as ibuprofen, help decrease swelling, pain, and fever   NSAIDs can cause stomach bleeding or kidney problems in certain people  If you take blood thinner medicine, always ask your healthcare provider if NSAIDs are safe for you  Always read the medicine label and follow directions  · Stool softeners help treat or prevent constipation  · Take your medicine as directed  Contact your healthcare provider if you think your medicine is not helping or if you have side effects  Tell him or her if you are allergic to any medicine  Keep a list of the medicines, vitamins, and herbs you take  Include the amounts, and when and why you take them  Bring the list or the pill bottles to follow-up visits  Carry your medicine list with you in case of an emergency  Activity:   · Rest as needed  Get up and move around as directed to help prevent blood clots  Start with short walks and slowly increase the distance every day  Limit the number of times you climb stairs to 2 times each day for the first week  Plan most of your daily activities on one level of your home  · Do not lift objects heavier than 10 pounds for 6 weeks  Avoid strenuous activity for 2 weeks  · Do not strain during bowel movements  High-fiber foods and extra liquids can help you prevent constipation  Examples of high-fiber foods are fruit and bran  Prune juice and water are good liquids to drink  · Do not have sex, use tampons, or douche for up to 8 weeks  You may shower as soon as the day after surgery  Tub baths can be taken starting 2 weeks after surgery  Do not go into pools or hot tubs until cleared by your doctor  · Ask when it is safe for you to drive  It is generally safe to drive after 2 weeks and when no longer taking prescription pain medication  · Ask when you may return to work and to other regular activities  Wound care: Care for your abdominal incisions as directed  Carefully wash around the wound with soap and water   If you have Hibiclens or medicated soap that you were instructed to use before surgery, you may use that to wash with for up to 2 days after surgery  If not, any mild non-scented, non-abrasive soap is safe  It is okay to let the soap and water run over your incision  Do not scrub your incision  Dry the area and put on new, clean bandages as directed  Change your bandages when they get wet or dirty  If you have strips of medical tape, let them fall off on their own  It may take 7 to 14 days for them to fall off  Check your incision every day for redness, swelling, or pus  Deep breathing: Take deep breaths and cough 10 times each hour  This will decrease your risk for a lung infection  Take a deep breath and hold it for as long as you can  Let the air out and then cough strongly  Deep breaths help open your airway  You may be given an incentive spirometer to help you take deep breaths  Put the plastic piece in your mouth and take a slow, deep breath, then let the air out and cough  Repeat these steps 10 times every hour  Get support: This surgery may be life-changing for you and your family  You will no longer be able to get pregnant  Sudden changes in the levels of your hormones may occur and cause mood swings and depression  You may feel angry, sad, or frightened, or cry frequently and unexpectedly  These feelings are normal  Talk to your healthcare provider about where you can get support  You can also ask if hormone replacement medicine is right for you  Follow up with your healthcare provider or gynecologist as directed: You may need to return to have stitches removed, and for other tests  Write down your questions so you remember to ask them during your visits  © 2017 [employer] Information is for End User's use only and may not be sold, redistributed or otherwise used for commercial purposes  All illustrations and images included in CareNotes® are the copyrighted property of [employer]  or [employer]  The above information is an  only  It is not intended as medical advice for individual conditions or treatments  Talk to your doctor, nurse or pharmacist before following any medical regimen to see if it is safe and effective for you  Postoperative Narcotic Use    You may have been prescribed narcotic pain medication to assist with your postoperative pain control  Narcotic pain medication is excellent for treating postoperative pain, but it has several side effects and addictive properties  We have prepared the following guide to help control your pain while using the minimum amount of narcotic pain medication  Our goal is for your pain to be under control, and although you may not be completely pain-free immediately, we expect you to be comfortable, and we expect your pain to improve over time  Being pain free after surgery is a process that takes time  The following suggestions will help you through that process while minimizing the need for narcotic pain medication  Use non-narcotic methods of pain control first   Use ice packs over your incision to reduce pain and swelling  This will help minimize the need for medications   For the first few days after your surgery, take 650 mg of Acetaminophen (Tylenol) every 6 hours regularly   In addition, take 600 mg of Ibuprofen (Motrin) every 6 hours regularly   Using these medications can help prevent you from having severe pain and can reduce how much narcotic pain medication you need  Only use narcotics when the other methods are not adequate   For the first few days after surgery, you may need additional pain relief   You may take your narcotic medication every 4-6 hours   As your pain improves over time, you should stop taking narcotics and use the non-narcotic methods listed above  After the first few days, you should not require narcotic medications   You may continue to take 650 mg of Acetaminophen (Tylenol) every 6 hours regularly     For additional pain relief, you may take Ibuprofen (Motrin) 600 mg as needed   If you have unused tablets of narcotic medications, see below  Do not take more than 4,000 mg of Acetaminophen (Tylenol) in a 24-hour period  Please ask your doctor for guidance on amount of Acetaminophen (Tylenol) it is safe to take if you have known liver disease  If you have a sensitive stomach, please take Ibuprofen (Motrin) with food  Please ask your doctor for guidance on safety of Ibuprofen (Motrin) if you have known kidney disease or if you have a history of weight loss surgery  Do not drive or operate heavy machinery while using narcotic medications  Narcotics can cause constipation  Please be sure to drink lots of water, and your doctor may recommend use of a stool softener, such as docusate sodium (Colace) 100 mg twice per day or Senna 8 6 mg every night  A few days of postoperative narcotic use is safe while breastfeeding  Disposing of Your Unused Narcotic Medication    It is very likely that you will not need all of the pills you have been prescribed  They should not be used to treat other pain  They should not be shared with other people  They should not be saved  It is not safe to take  medications  It is not safe to keep them around the house  It is not safe to keep them within reach of children or pets  You should dispose of unused pills by taking them to a Controlled Substance Disposal Site  Controlled Substance Disposal Sites    A Controlled Substance Disposal Site is a pharmacy or other location where you may safely and anonymously dispose of unused medication  The following locations are available in the [facility] area  If your local pharmacy is not on the list, please call and ask if they have a Controlled Substance Disposal service      [facility]  [address] [place] [address], [facility], [address], [place], [address] [facility], [address] [facility], [employer], [address] [facility] [address] [place], [address]   [address] [place] [address], [facility], [address] [facility] [address] departments may accept unused pills  Most local police departments have a drop box on their premises  Please contact your local police department for more information, or read additional information here: https://safe  pharmacy/drug-disposal/ [safe  pharmacy]     National Prescription Drug Take Back Day  There is a biannual National Prescription Drug Take Back Day, usually in April and October, when additional locations are available  Most [facility] locations participate in [place] Prescription Drug Take Back Day  Information for that can be found here:  https://takebackday ran gov/ [takebackday ran gov]     You can find more information about prescription disposal here:   https://safe  pharmacy/drug-disposal/ [safe  pharmacy]   [employer]  aspx [ddap pa gov]     Thank you

## 2021-02-11 NOTE — ANESTHESIA PREPROCEDURE EVALUATION
Procedure:  ROBOTIC TOTAL LAPAROSCOPIC HYSTERECTOMY, BILATERAL SALPINGO-OOPHORECTOMY, SENTINEL LYMPH NODE DISSECTION (N/A Abdomen)    Relevant Problems   CARDIO   (+) HTN (hypertension)      GYN   (+) Breast cancer (HCC)      Cr 0 9, hgb 13 6    TTE 2020: normal LV systolic function, low normal RV systolic function       Anesthesia Plan  ASA Score- 3     Anesthesia Type- general with ASA Monitors  Additional Monitors:   Airway Plan: ETT  Comment: General anesthesia, endotracheal tube; standard ASA monitors  Risks and benefits discussed with patient; patient consented and agrees to proceed  I saw and evaluated the patient  If seen with CRNA, we have discussed the anesthetic plan and I am in agreement that the plan is appropriate for the patient  Large bore IV access  Plan Factors-    Induction- intravenous  Postoperative Plan- Plan for postoperative opioid use  Planned trial extubation    Informed Consent- Anesthetic plan and risks discussed with patient  I personally reviewed this patient with the CRNA  Discussed and agreed on the Anesthesia Plan with the CRNA  Donna Hernandez

## 2021-02-11 NOTE — OP NOTE
OPERATIVE REPORT  PATIENT NAME: Selma Christine    :  1960  MRN: [id number]  Pt Location: [facility] OR ROOM 14    SURGERY DATE: 2021    Surgeon(s) and Role:     * Scot Dang MD - Primary     * Jadiel Curtis PA-C - Assisting     * Derrick Arrieta PA-C - Assisting     * Esthela Beebe MD - Assisting    Preop Diagnosis:  Endometrial intraepithelial neoplasia (EIN) [N85 02]    Post-Op Diagnosis Codes:     * Endometrial intraepithelial neoplasia (EIN) [N85 02]    Procedure(s) (LRB):  ROBOTIC TOTAL LAPAROSCOPIC HYSTERECTOMY, BILATERAL SALPINGO-OOPHORECTOMY, LEFT PELVIC SENTINEL LYMPH NODE DISSECTION (N/A)    Specimen(s):  ID Type Source Tests Collected by Time Destination   1 : Pelvic Washings with Cytolyt Washing Pelvic Washing NON-GYNECOLOGIC CYTOLOGY Scot Dang MD 2021  3:09 PM    2 : Left Pelvic [place] Lymph Nodes Tissue Lymph Node, [place] TISSUE EXAM Scot Dang MD 2021  3:15 PM    3 : Uterus, Cervix, Bilat  Fallopian tubes, Bilat  Ovaries Tissue Uterus w/Bilateral Ovaries and Fallopian Tubes TISSUE EXAM Scot Dang MD 2021  3:36 PM        Estimated Blood Loss:   100 mL    Drains:  Urethral Catheter Non-latex 16 Fr  (Active)   Number of days: 0       Anesthesia Type:   General    Operative Indications:  Endometrial intraepithelial neoplasia (EIN) [N85 02]  59yo with hx of post menopausal bleeding found to have EIN on D+C  The results of her diagnostic testing, her differential diagnosis and treatment options, and the benefits and risks of these were reviewed  She has a good understanding and has agreed to proceed with definitive surgical management  Operative Findings:  Normal bowel, omentum, liver, gallbladder  Normal appearing upper abdomen  Normal appearing uterus, bilateral fallopian tubes and ovaries  No evidence of extra-uterine disease  Left pelvic node mapped to the external iliac   [place] node did not map on the right and decision was made to forego full LND given adiposity and EIN diagnosis  Complications:   None    Procedure and Technique:  The patient was brought to the Operating Room where general anesthesia was induced  The abdomen, vagina and perineum were prepped and draped  Attention was turned to patient's vagina  Atraumatic vaginal retractors were placed to identify the cervix which was grasped with a single tooth tenaculum  The cervix was easily dilated and the uterus sounded to 10 cm  ICG dye diluted with 20 cc of sterile water was injected at 3:00 and 9:00 of cervix  One mL was injected approx 1 cm deep and additionally injected superficially  Total of 4 mL was injected  A small Dani ring was placed around the cervix  Then JAVY cannula was inserted in the uterine cavity without difficulty  The JAVY balloon was then inflated with water until resistance was met  A Manning catheter was placed in a sterile fashion  A point incision was made in the umbilicus, and with the abdomen under anterior traction with two christina-umbilical towel clamps, a Veress needle was inserted into the abdominal cavity with the carbon dioxide on low flow  Immediate pressure drop to 0 mm and diffuse abdominal distention indicated intraperitoneal placement  The peritoneal cavity was then insufflated with carbon dioxide on high flow to maintain a pressure of 15 mm Hg throughout the case  An incision was made 25 cm above the pubic symphysis, through which the robotic trocar was introduced into the abdominal cavity  The laparoscope was then inserted and the peritoneal cavity explored with the above findings  There was no evidence of visceral injury  Additional robotic ports and a 12mm assistant port were placed under direct visualization  With the patient in steep Trendelenburg, the robot was docked to the robotic ports and the instruments were placed under direct visualization  Careful survey of abdomen and pelvis was performed to reveal above notable findings     The peritoneum was incised in planes lateral and parallel to the ovarian vessels on both sides  Retroperitoneal space was entered  Avascular spaces were opened  The ureters were then identified retroperitoneally, coursing well inferiorly to the ovarian vessels  Firefly camera was activated on robotic system  Pelvic sentinel lymph nodes were detected on the left only  Nodes were carefully removed using monopolar cautery taking care to avoid neuronal and vascular injury  With the uterus on upward traction, a window was created superior to the ureter to skeleontize the ovarian vessels which were then coagulated proximally using the bipolar current then divided using the unipolar scissors  The round ligaments were isolated, cauterized and cut  The posterior peritoneum was dropped to the level of the koh ring  The vesico-uterine peritoneum was incised, and the bladder dissected from the cervix and upper vagina in a meticulous fashion to the level of the koh ring  The uterine vessels were then skeletonized bilaterally and coagulated at the level of the koh ring using bipolar current, then divided  With the bladder mobilized anteriorally and the rectum well away posteriorally, using the monopolar device, an incision was made in the anterior upper vagina at the level of the cervical-vaginal junction  The incision was continued circumferentially around the upper vagina, controlling upper vaginal blood supply laterally using the biopolar  This allowed completely amputation of the specimen  The vaginal balloon was removed  The uterus, cervix, bilateral fallopian tubes and ovaries were then removed en bloc transvaginally  The upper vagina were then closed laparoscopically with 2-0 stratafix, taking care to avoid bladder injury  The upper vagina was intact and hemostatic  The vaginal balloon was removed  The robotic instruments were removed, and the robot undocked   The laparoscopic skin incisions were irrigated and made hemostatic using electrocoagulation  They were closed with subcuticular stitches of 4-0 monocryl  All sponge, needle and instrument counts were correct x2  Anesthesia was reversed and the patient was taken to the PACU in a stable condition      I was present for the entire procedure    Patient Disposition:  PACU     SIGNATURE: Rasheed Borrego MD  DATE: February 11, 2021  TIME: 4:02 PM

## 2021-02-11 NOTE — ANESTHESIA PROCEDURE NOTES
Arterial Line Insertion  Performed by: Ernie Ojeda MD  Authorized by: Ernie Ojeda MD   Consent: Verbal consent obtained  Written consent obtained  Risks and benefits: risks, benefits and alternatives were discussed  Consent given by: patient  Patient understanding: patient states understanding of the procedure being performed  Patient consent: the patient's understanding of the procedure matches consent given  Procedure consent: procedure consent matches procedure scheduled  Relevant documents: relevant documents present and verified  Patient identity confirmed: arm band  Preparation: Patient was prepped and draped in the usual sterile fashion  Indications: hemodynamic monitoring  Orientation:  Right  Location: radial arteryMedication group details: ga    Procedure Details:  Needle gauge: 20  Seldinger technique: Seldinger technique used  Number of attempts: 2    Post-procedure:  Post-procedure: dressing applied  Waveform: good waveform and waveform confirmed  Patient tolerance: Patient tolerated the procedure well with no immediate complications

## 2021-02-26 ENCOUNTER — DOCUMENTATION (OUTPATIENT)
Dept: OTHER | Facility: HOSPITAL | Age: 61
End: 2021-02-26

## 2021-02-26 NOTE — PLAN OF CARE
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

## 2021-03-04 ENCOUNTER — TELEPHONE (OUTPATIENT)
Dept: GYNECOLOGIC ONCOLOGY | Facility: CLINIC | Age: 61
End: 2021-03-04

## 2021-03-04 NOTE — TELEPHONE ENCOUNTER
Confirmed appt for 3/8  All covid symptom screening questions are negative  Pt is aware of 1 visitor and masking policy for upcoming appt

## 2021-03-08 ENCOUNTER — OFFICE VISIT (OUTPATIENT)
Dept: GYNECOLOGIC ONCOLOGY | Facility: CLINIC | Age: 61
End: 2021-03-08

## 2021-03-08 VITALS
DIASTOLIC BLOOD PRESSURE: 88 MMHG | HEART RATE: 88 BPM | WEIGHT: 250 LBS | RESPIRATION RATE: 18 BRPM | HEIGHT: 67 IN | BODY MASS INDEX: 39.24 KG/M2 | TEMPERATURE: 98.6 F | SYSTOLIC BLOOD PRESSURE: 156 MMHG

## 2021-03-08 DIAGNOSIS — Z78.9 NEED FOR FOLLOW-UP BY SOCIAL WORKER: ICD-10-CM

## 2021-03-08 DIAGNOSIS — C54.1 ENDOMETRIAL CANCER (HCC): Primary | ICD-10-CM

## 2021-03-08 PROCEDURE — 99024 POSTOP FOLLOW-UP VISIT: CPT | Performed by: OBSTETRICS & GYNECOLOGY

## 2021-03-08 NOTE — ASSESSMENT & PLAN NOTE
65yo with h/o breast cancer, HTN and now newly diagnosed stage 1A endometrial adenocarcinoma, FIGO grade 1 presents for post op and treatment discussion  We discussed the results of her final pathology and a copy of the report was provided and explained to the patient  We discussed that her case had been reviewed at our department pathology conference  We discussed the results of GOG 99, which was a phase III randomized trial of fully stage patients with intermediate risk endometrial cancer, which identified risk factors for patients who may benefit from postoperative radiation therapy in terms of local regional recurrences and not overall survival  She has a grade 1 tumor, 42% myometrial invasion and focal lymphovascular space invasion  We discussed that given her age and only having 1 of the three risk factors with a focus of LVSI that observation is recommended  We discussed surveillance, which according to NCCN guidelines, is a physical exam every 3-6 months for 2-3 years and then every 6 months or annually  We discussed repeat imaging as clinically indicated  Referral to genetics

## 2021-03-08 NOTE — PROGRESS NOTES
Assessment/Plan:    Problem List Items Addressed This Visit        Genitourinary    Endometrial cancer ([facility] [address] ) - Primary     63yo with h/o breast cancer, HTN and now newly diagnosed stage 1A endometrial adenocarcinoma, FIGO grade 1 presents for post op and treatment discussion  We discussed the results of her final pathology and a copy of the report was provided and explained to the patient  We discussed that her case had been reviewed at our department pathology conference  We discussed the results of GOG 99, which was a phase III randomized trial of fully stage patients with intermediate risk endometrial cancer, which identified risk factors for patients who may benefit from postoperative radiation therapy in terms of local regional recurrences and not overall survival  She has a grade 1 tumor, 42% myometrial invasion and focal lymphovascular space invasion  We discussed that given her age and only having 1 of the three risk factors with a focus of LVSI that observation is recommended  We discussed surveillance, which according to NCCN guidelines, is a physical exam every 3-6 months for 2-3 years and then every 6 months or annually  We discussed repeat imaging as clinically indicated  Referral to genetics  Relevant Orders    Ambulatory referral to Genetics            CHIEF COMPLAINT: post op and treatment discussion       Problem:  Cancer Staging  No matching staging information was found for the patient  Previous therapy:  Oncology History    No history exists  Patient ID: Marcus Ortiz is a 61 y o  female  63yo with h/o breast cancer s/p chemo, HTN and newly diagnosed stage 1A endometria lcancer presents for psot op and treatment discussion     Ciara Lino well post operative  Denies vaginal bleeding/discharge  Normal BM/urination  No pelvic pain/abdominal pain  Appetite adequate  She reports developed vertigo post operatively but this resolved with chiropractor   She has a h/o lower back pain and neck pain for which she regularly sees a chiropractor  The following portions of the patient's history were reviewed and updated as appropriate: allergies, current medications, past family history, past medical history, past social history, past surgical history and problem list     Review of Systems   Constitutional: Positive for fatigue  Negative for appetite change, chills and fever  Respiratory: Negative for chest tightness and shortness of breath  Gastrointestinal: Negative for abdominal distention, abdominal pain, constipation, diarrhea and nausea  Genitourinary: Negative for difficulty urinating, flank pain, frequency, urgency, vaginal bleeding, vaginal discharge and vaginal pain  Musculoskeletal: Negative for back pain, joint swelling and myalgias  Skin: Negative for rash  Neurological: Negative for dizziness, light-headedness, numbness and headaches           Current Outpatient Medications:     acetaminophen (TYLENOL) 325 mg tablet, Take 2 tablets (650 mg total) by mouth every 6 (six) hours as needed for mild pain, Disp: 60 tablet, Rfl: 1    aspirin (Aspirin Low Dose) 81 mg chewable tablet, Chew 81 mg, Disp: , Rfl:     calcium citrate-vitamin D (CITRACAL+D) 315-200 MG-UNIT per tablet, Take by mouth daily , Disp: , Rfl:     carvedilol (COREG CR) 20 MG 24 hr capsule, 20 mg every morning, Disp: , Rfl:     Cholecalciferol (VITAMIN D3) 10 MCG (400 UNIT) CAPS, Take by mouth daily , Disp: , Rfl:     Cholecalciferol 250 MCG (42901 UT) CAPS, Take 1,000 Units by mouth daily, Disp: , Rfl:     Cod Liver Oil 10 MINIM CAPS, Take by mouth daily , Disp: , Rfl:     Lutein 40 MG CAPS, Take by mouth daily , Disp: , Rfl:     Multiple Vitamin (MULTIVITAMIN IRON-FREE PO), Take by mouth, Disp: , Rfl:     Omega-3 Fatty Acids (Fish Oil) 1200 MG CAPS, Take by mouth daily, Disp: , Rfl:     Objective:    Blood pressure 156/88, pulse 88, temperature 98 6 °F (37 °C), temperature source Temporal, resp  rate 18, height 5' 7" (1 702 m), weight 113 kg (250 lb)  Body mass index is 39 16 kg/m²  Body surface area is 2 22 meters squared  Physical Exam  HENT:      Head: Normocephalic and atraumatic  Neck:      Musculoskeletal: Normal range of motion  Cardiovascular:      Rate and Rhythm: Normal rate and regular rhythm  Pulmonary:      Effort: Pulmonary effort is normal    Abdominal:      General: There is no distension  Palpations: Abdomen is soft  There is no mass  Comments: Incisions c/d/i   Genitourinary:     Comments: The external female genitalia is normal  The bartholin's, uretheral and skenes glands are normal  The urethral meatus is normal (midline with no lesions)  Anus without fissure or lesion  Speculum exam reveals a grossly normal vagina cuff that is healing well  No masses, lesions,discharge or bleeding  Bimanual exam notes a surgical absent cervix, uterus and adnexal structures  No masses or fullness  Bladder is without fullness, mass or tenderness  Musculoskeletal: Normal range of motion  Skin:     General: Skin is warm and dry  Neurological:      Mental Status: She is alert and oriented to person, place, and time

## 2021-03-12 ENCOUNTER — PATIENT OUTREACH (OUTPATIENT)
Dept: CASE MANAGEMENT | Facility: HOSPITAL | Age: 61
End: 2021-03-12

## 2021-03-12 NOTE — PROGRESS NOTES
MSW received a Distress Thermometer from Gyn Onc, where the pt self scored a 7 to indicate her level of stress  The pt marked off worry as her psychosocial stressor  She indicated 1 out of 21 physical stressors  MSW reached out to the pt to offer support and assess any needs and the pt was open and receptive of this call  The pt explained that she was feeling nervous in the waiting room as she was awaiting the lab results form her surgery  When she met with Dr Urban Medina, she learned that she had favorable results  The pt states that her stress level immediately went down  Pt appears to be doing well at this time as she continues to recover from her surgery  Pt has no social work needs at this time  MSW explained how she can call the office if her needs change  Pt verbalized understanding  Emotional support provided

## 2021-03-30 DIAGNOSIS — Z23 ENCOUNTER FOR IMMUNIZATION: ICD-10-CM

## 2021-04-07 ENCOUNTER — IMMUNIZATIONS (OUTPATIENT)
Dept: FAMILY MEDICINE CLINIC | Facility: HOSPITAL | Age: 61
End: 2021-04-07

## 2021-04-07 DIAGNOSIS — Z23 ENCOUNTER FOR IMMUNIZATION: Primary | ICD-10-CM

## 2021-04-07 PROCEDURE — 91300 SARS-COV-2 / COVID-19 MRNA VACCINE (PFIZER-BIONTECH) 30 MCG: CPT

## 2021-04-07 PROCEDURE — 0001A SARS-COV-2 / COVID-19 MRNA VACCINE (PFIZER-BIONTECH) 30 MCG: CPT

## 2021-04-28 ENCOUNTER — TRANSCRIBE ORDERS (OUTPATIENT)
Dept: ADMINISTRATIVE | Age: 61
End: 2021-04-28

## 2021-04-28 ENCOUNTER — APPOINTMENT (OUTPATIENT)
Dept: LAB | Age: 61
End: 2021-04-28
Payer: COMMERCIAL

## 2021-04-28 ENCOUNTER — IMMUNIZATIONS (OUTPATIENT)
Dept: FAMILY MEDICINE CLINIC | Facility: HOSPITAL | Age: 61
End: 2021-04-28
Payer: COMMERCIAL

## 2021-04-28 DIAGNOSIS — Z23 ENCOUNTER FOR IMMUNIZATION: Primary | ICD-10-CM

## 2021-04-28 DIAGNOSIS — Z79.899 ENCOUNTER FOR LONG-TERM (CURRENT) USE OF OTHER MEDICATIONS: Primary | ICD-10-CM

## 2021-04-28 DIAGNOSIS — Z79.899 ENCOUNTER FOR LONG-TERM (CURRENT) USE OF OTHER MEDICATIONS: ICD-10-CM

## 2021-04-28 LAB
ALBUMIN SERPL BCP-MCNC: 3.5 G/DL (ref 3.5–5)
ALP SERPL-CCNC: 109 U/L (ref 46–116)
ALT SERPL W P-5'-P-CCNC: 19 U/L (ref 12–78)
ANION GAP SERPL CALCULATED.3IONS-SCNC: 5 MMOL/L (ref 4–13)
AST SERPL W P-5'-P-CCNC: 10 U/L (ref 5–45)
BILIRUB SERPL-MCNC: 0.43 MG/DL (ref 0.2–1)
BUN SERPL-MCNC: 20 MG/DL (ref 5–25)
CALCIUM SERPL-MCNC: 9.1 MG/DL (ref 8.3–10.1)
CHLORIDE SERPL-SCNC: 110 MMOL/L (ref 100–108)
CHOLEST SERPL-MCNC: 174 MG/DL (ref 50–200)
CO2 SERPL-SCNC: 26 MMOL/L (ref 21–32)
CREAT SERPL-MCNC: 0.89 MG/DL (ref 0.6–1.3)
GFR SERPL CREATININE-BSD FRML MDRD: 71 ML/MIN/1.73SQ M
GLUCOSE P FAST SERPL-MCNC: 90 MG/DL (ref 65–99)
HDLC SERPL-MCNC: 44 MG/DL
LDLC SERPL CALC-MCNC: 109 MG/DL (ref 0–100)
NONHDLC SERPL-MCNC: 130 MG/DL
POTASSIUM SERPL-SCNC: 3.8 MMOL/L (ref 3.5–5.3)
PROT SERPL-MCNC: 7.4 G/DL (ref 6.4–8.2)
SODIUM SERPL-SCNC: 141 MMOL/L (ref 136–145)
TRIGL SERPL-MCNC: 106 MG/DL
TSH SERPL DL<=0.05 MIU/L-ACNC: 5.02 UIU/ML (ref 0.36–3.74)

## 2021-04-28 PROCEDURE — 36415 COLL VENOUS BLD VENIPUNCTURE: CPT

## 2021-04-28 PROCEDURE — 80053 COMPREHEN METABOLIC PANEL: CPT

## 2021-04-28 PROCEDURE — 80061 LIPID PANEL: CPT

## 2021-04-28 PROCEDURE — 84443 ASSAY THYROID STIM HORMONE: CPT

## 2021-06-09 ENCOUNTER — OFFICE VISIT (OUTPATIENT)
Dept: FAMILY MEDICINE CLINIC | Facility: CLINIC | Age: 61
End: 2021-06-09
Payer: COMMERCIAL

## 2021-06-09 VITALS
HEART RATE: 96 BPM | HEIGHT: 67 IN | OXYGEN SATURATION: 97 % | TEMPERATURE: 97.7 F | DIASTOLIC BLOOD PRESSURE: 100 MMHG | SYSTOLIC BLOOD PRESSURE: 150 MMHG | WEIGHT: 256.4 LBS | BODY MASS INDEX: 40.24 KG/M2 | RESPIRATION RATE: 20 BRPM

## 2021-06-09 DIAGNOSIS — K21.00 GASTROESOPHAGEAL REFLUX DISEASE WITH ESOPHAGITIS WITHOUT HEMORRHAGE: ICD-10-CM

## 2021-06-09 DIAGNOSIS — E55.9 VITAMIN D DEFICIENCY: ICD-10-CM

## 2021-06-09 DIAGNOSIS — Z85.3 HISTORY OF RIGHT BREAST CANCER: ICD-10-CM

## 2021-06-09 DIAGNOSIS — Z11.59 NEED FOR HEPATITIS C SCREENING TEST: ICD-10-CM

## 2021-06-09 DIAGNOSIS — E66.01 MORBID OBESITY WITH BMI OF 40.0-44.9, ADULT (HCC): ICD-10-CM

## 2021-06-09 DIAGNOSIS — I10 HYPERTENSION, UNSPECIFIED TYPE: ICD-10-CM

## 2021-06-09 DIAGNOSIS — G43.909 MIGRAINE WITHOUT STATUS MIGRAINOSUS, NOT INTRACTABLE, UNSPECIFIED MIGRAINE TYPE: ICD-10-CM

## 2021-06-09 DIAGNOSIS — R42 VERTIGO: Primary | ICD-10-CM

## 2021-06-09 DIAGNOSIS — R79.89 ABNORMAL TSH: ICD-10-CM

## 2021-06-09 PROCEDURE — 3077F SYST BP >= 140 MM HG: CPT | Performed by: FAMILY MEDICINE

## 2021-06-09 PROCEDURE — 3080F DIAST BP >= 90 MM HG: CPT | Performed by: FAMILY MEDICINE

## 2021-06-09 PROCEDURE — 99203 OFFICE O/P NEW LOW 30 MIN: CPT | Performed by: FAMILY MEDICINE

## 2021-06-09 PROCEDURE — 3725F SCREEN DEPRESSION PERFORMED: CPT | Performed by: FAMILY MEDICINE

## 2021-06-09 RX ORDER — IRBESARTAN 75 MG/1
75 TABLET ORAL
Qty: 30 TABLET | Refills: 0
Start: 2021-06-09

## 2021-06-09 NOTE — PROGRESS NOTES
Assessment/Plan:    HTN (hypertension)  Continue carvedilol and irbesartan per cardiology  Gastroesophageal reflux disease with esophagitis without hemorrhage  [facility] GI Dr Ta Has  Continue omeprazole  Migraine without status migrainosus, not intractable  Usually 2/year and tylenol helped  Flu shot yearly  Got Covid19 vaccine  Denies SE  Tdap 2016  Will check insurance for coverage of Tdap and shingrix  Due for mammogram  Give script today  5/2021 Colonoscopy done  Repeat in 5 years  RTO in 6 months  Diagnoses and all orders for this visit:    Vertigo    Hypertension, unspecified type  -     irbesartan (AVAPRO) 75 mg tablet; Take 1 tablet (75 mg total) by mouth daily at bedtime  -     CBC and differential; Future  -     Comprehensive metabolic panel; Future  -     Vitamin D 25 hydroxy; Future  -     TSH, 3rd generation; Future  -     T4, free; Future    Abnormal TSH  -     CBC and differential; Future  -     Comprehensive metabolic panel; Future  -     Vitamin D 25 hydroxy; Future  -     TSH, 3rd generation; Future  -     T4, free; Future    Gastroesophageal reflux disease with esophagitis without hemorrhage    History of right breast cancer  -     Mammo diagnostic bilateral w cad; Future    Vitamin D deficiency  -     CBC and differential; Future  -     Comprehensive metabolic panel; Future  -     Vitamin D 25 hydroxy; Future  -     TSH, 3rd generation; Future  -     T4, free; Future    Need for hepatitis C screening test  -     Hepatitis C Antibody ([facility]); Future    Migraine without status migrainosus, not intractable, unspecified migraine type    Morbid obesity with BMI of 40 0-44 9, adult ([facility] [address] )    Other orders  -     Cancel: HIV 1/2 Antigen/Antibody (4th Generation) w Reflex [facility]; Future  -     Cancel: Cologuard; Future  -     Cancel: Occult Blood, Fecal Immunochemical (FIT); Future  -     Cancel: Ambulatory referral for Colonoscopy;  Future          Subjective:      Patient ID: Sera Warren is a 61 y o  female  HPI    Pt is here by herself to establish care  No PCP for a while   GERMAN had hysterectomy in 2/2021 because of endometrial cancer  Since then vertigo got worse  Laying and turning left felt dizzy  Last for seconds and went away  Refused medication or PT now  Follow chiropractor  HTN----[facility] cardiology Dr Eufemia Cotter regularly Q 6 months    4/2021 Holter monitor and nuclear test done normal per pt  She is on carvedilol 20mg QD and irbesartan 75mg QD  She is on ASA 81mg QD  BP elevated recently  Advised pt to check it at home  Abnormal TSH---tired and gaining weight  4/2021 TSH 5 02 slightly elevated  Would like to recheck  Mother has hypothyroidism  GERD---She is on omeprazole 40mg  Had EGD/coloscopy done by Dr Rustam Rai recently  Hx of Right Breast cancer 2006  Had lumpectomy, chemo and radiation  Saw Dr Kolton Moseley and Dr Payal Ortiz before  Due for mammogram      Migraine---2/year  Lost peripheral vision when she had migraine  Tylenol helped  BMI 40 76 today  Refused wt loss program now  Will try to lose weight by herself  No smoking, alcohol  Denies depression  Always a worrier  The following portions of the patient's history were reviewed and updated as appropriate: allergies, current medications, past family history, past medical history, past social history, past surgical history and problem list     Review of Systems   Constitutional: Negative for appetite change, chills and fever  HENT: Negative for congestion, ear pain, sinus pain and sore throat  Eyes: Negative for discharge and itching  Respiratory: Negative for apnea, cough, chest tightness, shortness of breath and wheezing  Cardiovascular: Negative for chest pain, palpitations and leg swelling  Gastrointestinal: Negative for abdominal pain, anal bleeding, constipation, diarrhea, nausea and vomiting     Endocrine: Negative for cold intolerance, heat intolerance and polyuria  Genitourinary: Negative for difficulty urinating and dysuria  Musculoskeletal: Negative for arthralgias, back pain and myalgias  Skin: Negative for rash  Neurological: Negative for dizziness and headaches  Psychiatric/Behavioral: Negative for agitation  Objective:      /100 (BP Location: Left arm, Patient Position: Sitting, Cuff Size: Large)   Pulse 96   Temp 97 7 °F (36 5 °C) (Temporal)   Resp 20   Ht 5' 6 5" (1 689 m)   Wt 116 kg (256 lb 6 4 oz)   SpO2 97%   BMI 40 76 kg/m²          Physical Exam  Constitutional:       General: She is not in acute distress  Appearance: She is well-developed  HENT:      Head: Normocephalic  Eyes:      General:         Right eye: No discharge  Left eye: No discharge  Conjunctiva/sclera: Conjunctivae normal    Neck:      Musculoskeletal: Normal range of motion  Thyroid: No thyromegaly  Cardiovascular:      Rate and Rhythm: Normal rate and regular rhythm  Heart sounds: Normal heart sounds  No murmur  No friction rub  No gallop  Pulmonary:      Effort: Pulmonary effort is normal  No respiratory distress  Breath sounds: Normal breath sounds  No wheezing or rales  Chest:      Chest wall: No tenderness  Abdominal:      General: Bowel sounds are normal  There is no distension  Palpations: Abdomen is soft  There is no mass  Tenderness: There is no abdominal tenderness  There is no guarding or rebound  Musculoskeletal: Normal range of motion  General: No tenderness or deformity  Right lower leg: No edema  Left lower leg: No edema  Lymphadenopathy:      Cervical: No cervical adenopathy  Neurological:      Mental Status: She is alert  BMI Counseling: Body mass index is 40 76 kg/m²   The BMI is above normal  Nutrition recommendations include decreasing portion sizes, encouraging healthy choices of fruits and vegetables, decreasing fast food intake, consuming healthier snacks and limiting drinks that contain sugar  Exercise recommendations include moderate physical activity 150 minutes/week  No pharmacotherapy was ordered

## 2021-06-09 NOTE — PROGRESS NOTES
Chief Complaint   Patient presents with   Isis Courser Establish Care     New patient  Hysterectomy in Feb  and has had vertigo and bloating  Concern with elevated BP and thyroid  Health Maintenance   Topic Date Due    Hepatitis C Screening  Never done    HIV Screening  Never done    BMI: Followup Plan  Never done    Colorectal Cancer Screening  Never done    MAMMOGRAM  09/23/2017    Annual Physical  07/23/2021    Influenza Vaccine (Season Ended) 09/01/2021    Depression Screening PHQ  06/09/2022    BMI: Adult  06/09/2022    Cervical Cancer Screening  07/23/2023    DTaP,Tdap,and Td Vaccines (2 - Td) 07/07/2026    COVID-19 Vaccine  Completed    Pneumococcal Vaccine: Pediatrics (0 to 5 Years) and At-Risk Patients (6 to 59 Years)  Aged Out    HIB Vaccine  Aged Out    Hepatitis B Vaccine  Aged Out    IPV Vaccine  Aged Out    Hepatitis A Vaccine  Aged Out    Meningococcal ACWY Vaccine  Aged Out    HPV Vaccine  Aged Out             Assessment/Plan:    No problem-specific Assessment & Plan notes found for this encounter  {Assess/PlanSmartLinks:86784}      Subjective:      Patient ID: Rabia Maher is a 61 y o  female      HPI    {Common ambulatory SmartLinks:22621}    Review of Systems      Objective:      BP (!) 156/108 (BP Location: Left arm, Patient Position: Sitting, Cuff Size: Large)   Pulse 96   Temp 97 7 °F (36 5 °C) (Temporal)   Resp 20   Ht 5' 6 5" (1 689 m)   Wt 116 kg (256 lb 6 4 oz)   SpO2 97%   BMI 40 76 kg/m²          Physical Exam

## 2021-06-14 ENCOUNTER — OFFICE VISIT (OUTPATIENT)
Dept: GYNECOLOGIC ONCOLOGY | Facility: CLINIC | Age: 61
End: 2021-06-14
Payer: COMMERCIAL

## 2021-06-14 VITALS
HEIGHT: 67 IN | SYSTOLIC BLOOD PRESSURE: 168 MMHG | TEMPERATURE: 98.6 F | RESPIRATION RATE: 18 BRPM | DIASTOLIC BLOOD PRESSURE: 90 MMHG | WEIGHT: 259 LBS | BODY MASS INDEX: 40.65 KG/M2 | HEART RATE: 75 BPM

## 2021-06-14 DIAGNOSIS — Z85.42 ENCOUNTER FOR FOLLOW-UP SURVEILLANCE OF ENDOMETRIAL CANCER: Primary | ICD-10-CM

## 2021-06-14 DIAGNOSIS — Z08 ENCOUNTER FOR FOLLOW-UP SURVEILLANCE OF ENDOMETRIAL CANCER: Primary | ICD-10-CM

## 2021-06-14 PROCEDURE — 3008F BODY MASS INDEX DOCD: CPT | Performed by: OBSTETRICS & GYNECOLOGY

## 2021-06-14 PROCEDURE — 99213 OFFICE O/P EST LOW 20 MIN: CPT | Performed by: OBSTETRICS & GYNECOLOGY

## 2021-06-14 PROCEDURE — 1036F TOBACCO NON-USER: CPT | Performed by: OBSTETRICS & GYNECOLOGY

## 2021-06-14 RX ORDER — OMEPRAZOLE 20 MG/1
20 CAPSULE, DELAYED RELEASE ORAL DAILY
COMMUNITY

## 2021-06-14 NOTE — PROGRESS NOTES
Assessment/Plan:    Problem List Items Addressed This Visit        Other    Encounter for follow-up surveillance of endometrial cancer - Primary     65yo with h/o breast cancer, HTN and stage 1A endometrial adenocarcinoma, FIGO grade 1 presents for surveillance visit  Patient is clinically BRANDEN  Her exam is benign  We discussed continued surveillance q 3 months or sooner with symptoms  She has not completed genetics, referral was placed  Relevant Orders    Ambulatory Referral to Oncology Genetics            CHIEF COMPLAINT: surveillance       Problem:  Cancer Staging  No matching staging information was found for the patient  Previous therapy:  Oncology History    No history exists  Patient ID: Dannielle Alexander is a 61 y o  female  65yo with h/o breast cancer s/p chemo, HTN and stage 1A endometrial cancer presents for surveillance visit  Pt denies abdominal bloating/pain/cramping  No vaginal bleeding/discharge  No changes in BM/urination  No early satiety/Appetite adequate  She has attempted to lose weight but is finding it difficulty  She is seeing her PCP for further thyroid work up after abnormal TSH noted  The following portions of the patient's history were reviewed and updated as appropriate: allergies, current medications, past family history, past medical history, past social history, past surgical history and problem list     Review of Systems   Constitutional: Negative for appetite change, chills, fatigue and fever  Respiratory: Negative for chest tightness and shortness of breath  Gastrointestinal: Negative for abdominal distention, abdominal pain, constipation, diarrhea and nausea  Genitourinary: Negative for difficulty urinating, flank pain, frequency, urgency, vaginal bleeding, vaginal discharge and vaginal pain  Musculoskeletal: Negative for back pain, joint swelling and myalgias  Skin: Negative for rash     Neurological: Negative for dizziness, light-headedness, numbness and headaches  Current Outpatient Medications   Medication Sig Dispense Refill    aspirin (Aspirin Low Dose) 81 mg chewable tablet Chew 81 mg      calcium citrate-vitamin D (CITRACAL+D) 315-200 MG-UNIT per tablet Take by mouth daily       carvedilol (COREG CR) 20 MG 24 hr capsule 20 mg every morning      Cholecalciferol (VITAMIN D3) 10 MCG (400 UNIT) CAPS Take by mouth daily       Cholecalciferol 250 MCG (20590 UT) CAPS Take 1,000 Units by mouth daily      Cod Liver Oil 10 MINIM CAPS Take by mouth daily       irbesartan (AVAPRO) 75 mg tablet Take 1 tablet (75 mg total) by mouth daily at bedtime 30 tablet 0    Lutein 40 MG CAPS Take by mouth daily       Multiple Vitamin (MULTIVITAMIN IRON-FREE PO) Take by mouth      Omega-3 Fatty Acids (Fish Oil) 1200 MG CAPS Take by mouth daily       No current facility-administered medications for this visit  Objective: There were no vitals taken for this visit  There is no height or weight on file to calculate BMI  There is no height or weight on file to calculate BSA  Physical Exam  HENT:      Head: Normocephalic and atraumatic  Cardiovascular:      Rate and Rhythm: Normal rate and regular rhythm  Pulmonary:      Effort: Pulmonary effort is normal    Abdominal:      Palpations: Abdomen is soft  Genitourinary:     Comments: The external female genitalia is normal  The bartholin's, uretheral and skenes glands are normal  The urethral meatus is normal (midline with no lesions)  Anus without fissure or lesion  Speculum exam reveals a grossly normal vagina cuff  No masses, lesions,discharge or bleeding  No significant cystocele or rectocele noted  Bimanual exam notes a surgical absent cervix, uterus and adnexal structures  No masses or fullness  Bladder is without fullness, mass or tenderness  Musculoskeletal:         General: Normal range of motion  Cervical back: Normal range of motion     Skin:     General: Skin is warm and dry  Neurological:      Mental Status: She is alert and oriented to person, place, and time             Lab Results   Component Value Date     07/23/2014    K 3 8 04/28/2021     (H) 04/28/2021    CO2 26 04/28/2021    ANIONGAP 8 07/23/2014    BUN 20 04/28/2021    CREATININE 0 89 04/28/2021    GLUCOSE 99 07/23/2014    GLUF 90 04/28/2021    CALCIUM 9 1 04/28/2021    CORRECTEDCA 8 9 10/18/2020    AST 10 04/28/2021    ALT 19 04/28/2021    ALKPHOS 109 04/28/2021    PROT 7 6 07/23/2014    BILITOT 0 25 07/23/2014    EGFR 71 04/28/2021     Lab Results   Component Value Date    WBC 7 70 01/20/2021    HGB 13 6 01/20/2021    HCT 40 7 01/20/2021    MCV 95 01/20/2021     01/20/2021     Lab Results   Component Value Date    NEUTROABS 4 25 10/18/2020        Trend:  No results found for:

## 2021-06-14 NOTE — ASSESSMENT & PLAN NOTE
65yo with h/o breast cancer, HTN and stage 1A endometrial adenocarcinoma, FIGO grade 1 presents for surveillance visit  Patient is clinically BRANDEN  Her exam is benign  We discussed continued surveillance q 3 months or sooner with symptoms  She has not completed genetics, referral was placed

## 2021-06-15 ENCOUNTER — APPOINTMENT (OUTPATIENT)
Dept: LAB | Age: 61
End: 2021-06-15
Payer: COMMERCIAL

## 2021-06-15 DIAGNOSIS — I10 HYPERTENSION, UNSPECIFIED TYPE: ICD-10-CM

## 2021-06-15 DIAGNOSIS — E03.9 HYPOTHYROIDISM, UNSPECIFIED TYPE: Primary | ICD-10-CM

## 2021-06-15 DIAGNOSIS — R79.89 ABNORMAL TSH: ICD-10-CM

## 2021-06-15 DIAGNOSIS — E55.9 VITAMIN D DEFICIENCY: ICD-10-CM

## 2021-06-15 DIAGNOSIS — Z11.59 NEED FOR HEPATITIS C SCREENING TEST: ICD-10-CM

## 2021-06-15 LAB
25(OH)D3 SERPL-MCNC: 29.8 NG/ML (ref 30–100)
ALBUMIN SERPL BCP-MCNC: 3.3 G/DL (ref 3.5–5)
ALP SERPL-CCNC: 112 U/L (ref 46–116)
ALT SERPL W P-5'-P-CCNC: 23 U/L (ref 12–78)
ANION GAP SERPL CALCULATED.3IONS-SCNC: 7 MMOL/L (ref 4–13)
AST SERPL W P-5'-P-CCNC: 17 U/L (ref 5–45)
BASOPHILS # BLD AUTO: 0.04 THOUSANDS/ΜL (ref 0–0.1)
BASOPHILS NFR BLD AUTO: 1 % (ref 0–1)
BILIRUB SERPL-MCNC: 0.45 MG/DL (ref 0.2–1)
BUN SERPL-MCNC: 16 MG/DL (ref 5–25)
CALCIUM ALBUM COR SERPL-MCNC: 9.4 MG/DL (ref 8.3–10.1)
CALCIUM SERPL-MCNC: 8.8 MG/DL (ref 8.3–10.1)
CHLORIDE SERPL-SCNC: 106 MMOL/L (ref 100–108)
CO2 SERPL-SCNC: 27 MMOL/L (ref 21–32)
CREAT SERPL-MCNC: 0.92 MG/DL (ref 0.6–1.3)
EOSINOPHIL # BLD AUTO: 0.11 THOUSAND/ΜL (ref 0–0.61)
EOSINOPHIL NFR BLD AUTO: 2 % (ref 0–6)
ERYTHROCYTE [DISTWIDTH] IN BLOOD BY AUTOMATED COUNT: 12.5 % (ref 11.6–15.1)
GFR SERPL CREATININE-BSD FRML MDRD: 68 ML/MIN/1.73SQ M
GLUCOSE P FAST SERPL-MCNC: 92 MG/DL (ref 65–99)
HCT VFR BLD AUTO: 38.3 % (ref 34.8–46.1)
HCV AB SER QL: NORMAL
HGB BLD-MCNC: 12.6 G/DL (ref 11.5–15.4)
IMM GRANULOCYTES # BLD AUTO: 0.02 THOUSAND/UL (ref 0–0.2)
IMM GRANULOCYTES NFR BLD AUTO: 0 % (ref 0–2)
LYMPHOCYTES # BLD AUTO: 2.04 THOUSANDS/ΜL (ref 0.6–4.47)
LYMPHOCYTES NFR BLD AUTO: 32 % (ref 14–44)
MCH RBC QN AUTO: 30.9 PG (ref 26.8–34.3)
MCHC RBC AUTO-ENTMCNC: 32.9 G/DL (ref 31.4–37.4)
MCV RBC AUTO: 94 FL (ref 82–98)
MONOCYTES # BLD AUTO: 0.57 THOUSAND/ΜL (ref 0.17–1.22)
MONOCYTES NFR BLD AUTO: 9 % (ref 4–12)
NEUTROPHILS # BLD AUTO: 3.54 THOUSANDS/ΜL (ref 1.85–7.62)
NEUTS SEG NFR BLD AUTO: 56 % (ref 43–75)
NRBC BLD AUTO-RTO: 0 /100 WBCS
PLATELET # BLD AUTO: 225 THOUSANDS/UL (ref 149–390)
PMV BLD AUTO: 11.2 FL (ref 8.9–12.7)
POTASSIUM SERPL-SCNC: 4 MMOL/L (ref 3.5–5.3)
PROT SERPL-MCNC: 7.5 G/DL (ref 6.4–8.2)
RBC # BLD AUTO: 4.08 MILLION/UL (ref 3.81–5.12)
SODIUM SERPL-SCNC: 140 MMOL/L (ref 136–145)
T4 FREE SERPL-MCNC: 0.74 NG/DL (ref 0.76–1.46)
TSH SERPL DL<=0.05 MIU/L-ACNC: 4.65 UIU/ML (ref 0.36–3.74)
WBC # BLD AUTO: 6.32 THOUSAND/UL (ref 4.31–10.16)

## 2021-06-15 PROCEDURE — 36415 COLL VENOUS BLD VENIPUNCTURE: CPT

## 2021-06-15 PROCEDURE — 84439 ASSAY OF FREE THYROXINE: CPT

## 2021-06-15 PROCEDURE — 82306 VITAMIN D 25 HYDROXY: CPT

## 2021-06-15 PROCEDURE — 80053 COMPREHEN METABOLIC PANEL: CPT

## 2021-06-15 PROCEDURE — 85025 COMPLETE CBC W/AUTO DIFF WBC: CPT

## 2021-06-15 PROCEDURE — 86803 HEPATITIS C AB TEST: CPT

## 2021-06-15 PROCEDURE — 84443 ASSAY THYROID STIM HORMONE: CPT

## 2021-06-15 RX ORDER — LEVOTHYROXINE SODIUM 0.05 MG/1
50 TABLET ORAL
Qty: 30 TABLET | Refills: 1 | Status: SHIPPED | OUTPATIENT
Start: 2021-06-15 | End: 2021-06-16 | Stop reason: SDUPTHER

## 2021-06-21 ENCOUNTER — TELEPHONE (OUTPATIENT)
Dept: HEMATOLOGY ONCOLOGY | Facility: CLINIC | Age: 61
End: 2021-06-21

## 2021-06-21 NOTE — TELEPHONE ENCOUNTER
Spoke to patient regarding referral for [facility]  Patient stated at this time she does not want to schedule but took our number and will think about it and call us back should she decide to schedule  Dr Renata Delgadillo referred

## 2021-08-12 ENCOUNTER — APPOINTMENT (OUTPATIENT)
Dept: LAB | Age: 61
End: 2021-08-12
Payer: COMMERCIAL

## 2021-08-12 DIAGNOSIS — E03.9 HYPOTHYROIDISM, UNSPECIFIED TYPE: ICD-10-CM

## 2021-08-12 LAB — TSH SERPL DL<=0.05 MIU/L-ACNC: 2.4 UIU/ML (ref 0.36–3.74)

## 2021-08-12 PROCEDURE — 36415 COLL VENOUS BLD VENIPUNCTURE: CPT

## 2021-08-12 PROCEDURE — 84443 ASSAY THYROID STIM HORMONE: CPT

## 2021-08-13 DIAGNOSIS — E03.9 HYPOTHYROIDISM, UNSPECIFIED TYPE: ICD-10-CM

## 2021-08-13 RX ORDER — LEVOTHYROXINE SODIUM 0.05 MG/1
50 TABLET ORAL
Qty: 90 TABLET | Refills: 1 | Status: SHIPPED | OUTPATIENT
Start: 2021-08-13 | End: 2021-12-29

## 2021-09-13 ENCOUNTER — TELEPHONE (OUTPATIENT)
Dept: SURGICAL ONCOLOGY | Facility: CLINIC | Age: 61
End: 2021-09-13

## 2021-09-20 ENCOUNTER — HOSPITAL ENCOUNTER (OUTPATIENT)
Dept: RADIOLOGY | Age: 61
Discharge: HOME | End: 2021-09-20
Payer: COMMERCIAL

## 2021-09-20 DIAGNOSIS — E34.9 ENDOCRINE DISORDER, UNSPECIFIED: ICD-10-CM

## 2021-09-20 DIAGNOSIS — E04.1 NONTOXIC SINGLE THYROID NODULE: ICD-10-CM

## 2021-09-20 DIAGNOSIS — E03.9 HYPOTHYROIDISM, UNSPECIFIED: ICD-10-CM

## 2021-09-20 DIAGNOSIS — I10 ESSENTIAL (PRIMARY) HYPERTENSION: ICD-10-CM

## 2021-09-20 PROCEDURE — 76536 US EXAM OF HEAD AND NECK: CPT

## 2021-09-20 NOTE — ASSESSMENT & PLAN NOTE
61yo with h/o breast cancer, HTN and stage 1A endometrial adenocarcinoma, FIGO grade 1 presents for surveillance visit      Clinically BRANDEN   No complaints at this time    Continue with q3 month surveillance visits or sooner with symptoms

## 2021-09-20 NOTE — PROGRESS NOTES
Assessment/Plan:    Problem List Items Addressed This Visit        Genitourinary    Endometrial cancer ([facility] )       Other    Breast cancer ([facility] )     Over due for mammogram  Pt has ordered but is afraid to go  We discussed importance  She reports she will schedule          Encounter for follow-up surveillance of endometrial cancer - Primary     60yo with h/o breast cancer, HTN and stage 1A endometrial adenocarcinoma, FIGO grade 1 presents for surveillance visit      Clinically BRANDEN   No complaints at this time    Continue with q3 month surveillance visits or sooner with symptoms                  REASON FOR VISIT:   Survivorship    Problem:  Cancer Staging  Endometrial cancer ([place] [address] )  Staging form: Corpus Uteri - Carcinoma, AJCC 8th Edition  - Clinical: FIGO Stage IA - Signed by Kellen Cary MD on 6/14/2021        Previous therapy:  Oncology History   Endometrial cancer ([place] [address] )   11/9/2020 Initial Diagnosis    Endometrial cancer ([place] [address] )     2/11/2021 Surgery    RA-TLH/BSO/SLND     6/14/2021 -  Cancer Staged    Staging form: Corpus Uteri - Carcinoma, AJCC 8th Edition  - Clinical: FIGO Stage IA - Signed by Kellen Cary MD on 6/14/2021  Histologic grade (G): G1  Histologic grading system: 3 grade system  Lymph-vascular invasion (LVI): LVI present/identified, NOS             Patient ID: Scooter Cam is a 64 y o  female  65yo with h/o breast cancer s/p chemo, HTN and stage 1A endometrial cancer presents for surveillance visit  She did not require adjuvant therapy and is undergoing surveillance visits  Patient has no complaints at this time  She denies any vaginal bleeding  She does note occasional vaginal discharge which may be urine  She occasionally leaks on coughing laughing and sneezing  She denies abdominal pain or distension  No changes in urination or bowel movements  Objective:    Blood pressure 130/84, pulse 83, temperature 98 3 °F (36 8 °C), temperature source Temporal, resp   rate 18, height 5' 6 5" (1 689 m), weight 120 kg (265 lb), SpO2 97 %  Body mass index is 42 13 kg/m²  Body surface area is 2 27 meters squared  Physical Exam  Constitutional:       Appearance: She is obese  HENT:      Head: Normocephalic and atraumatic  Cardiovascular:      Rate and Rhythm: Normal rate and regular rhythm  Heart sounds: Normal heart sounds  Pulmonary:      Effort: Pulmonary effort is normal    Abdominal:      Palpations: Abdomen is soft  Genitourinary:     Comments: The external female genitalia is normal  The bartholin's, uretheral and skenes glands are normal  The urethral meatus is normal (midline with no lesions)  Anus without fissure or lesion  Speculum exam reveals a grossly normal vagina cuff  No masses, lesions,discharge or bleeding  No significant cystocele or rectocele noted  Bimanual exam notes a surgical absent cervix, uterus and adnexal structures  No masses or fullness  Bladder is without fullness, mass or tenderness  Musculoskeletal:         General: Normal range of motion  Cervical back: Normal range of motion  Skin:     General: Skin is warm and dry  Neurological:      Mental Status: She is alert and oriented to person, place, and time  Discussed symptoms related to disease recurrence, Yes    Evaluated for late effects related to cancer treatment, Yes, describe: none    Screening current for other malignancies, Yes, describe: mammogram ordered and not done - discussed importance       Oncology Treatment Summary reviewed with patient and copy provided, Yes    Survivorship Distress Thermometer Reviewed, Yes

## 2021-09-22 ENCOUNTER — ONCOLOGY SURVIVORSHIP (OUTPATIENT)
Dept: GYNECOLOGIC ONCOLOGY | Facility: CLINIC | Age: 61
End: 2021-09-22
Payer: COMMERCIAL

## 2021-09-22 VITALS
BODY MASS INDEX: 41.59 KG/M2 | HEIGHT: 67 IN | WEIGHT: 265 LBS | RESPIRATION RATE: 18 BRPM | DIASTOLIC BLOOD PRESSURE: 84 MMHG | OXYGEN SATURATION: 97 % | HEART RATE: 83 BPM | SYSTOLIC BLOOD PRESSURE: 130 MMHG | TEMPERATURE: 98.3 F

## 2021-09-22 DIAGNOSIS — Z85.42 ENCOUNTER FOR FOLLOW-UP SURVEILLANCE OF ENDOMETRIAL CANCER: Primary | ICD-10-CM

## 2021-09-22 DIAGNOSIS — C50.911 MALIGNANT NEOPLASM OF RIGHT FEMALE BREAST, UNSPECIFIED ESTROGEN RECEPTOR STATUS, UNSPECIFIED SITE OF BREAST (HCC): ICD-10-CM

## 2021-09-22 DIAGNOSIS — C54.1 ENDOMETRIAL CANCER (HCC): ICD-10-CM

## 2021-09-22 DIAGNOSIS — Z08 ENCOUNTER FOR FOLLOW-UP SURVEILLANCE OF ENDOMETRIAL CANCER: Primary | ICD-10-CM

## 2021-09-22 PROCEDURE — 3008F BODY MASS INDEX DOCD: CPT | Performed by: OBSTETRICS & GYNECOLOGY

## 2021-09-22 PROCEDURE — 99213 OFFICE O/P EST LOW 20 MIN: CPT | Performed by: OBSTETRICS & GYNECOLOGY

## 2021-09-22 NOTE — ASSESSMENT & PLAN NOTE
Over due for mammogram  Pt has ordered but is afraid to go  We discussed importance   She reports she will schedule

## 2021-09-27 ENCOUNTER — APPOINTMENT (OUTPATIENT)
Dept: LAB | Age: 61
End: 2021-09-27
Payer: COMMERCIAL

## 2021-09-27 DIAGNOSIS — I10 ESSENTIAL HYPERTENSION, MALIGNANT: ICD-10-CM

## 2021-09-27 DIAGNOSIS — I51.9 MYXEDEMA HEART DISEASE: ICD-10-CM

## 2021-09-27 DIAGNOSIS — E04.1 NONTOXIC UNINODULAR GOITER: ICD-10-CM

## 2021-09-27 DIAGNOSIS — E03.9 MYXEDEMA HEART DISEASE: ICD-10-CM

## 2021-09-27 DIAGNOSIS — L08.0 PYODERMA: ICD-10-CM

## 2021-09-27 DIAGNOSIS — E66.8 OBESITY OF ENDOCRINE ORIGIN: ICD-10-CM

## 2021-09-27 LAB
25(OH)D3 SERPL-MCNC: 33.1 NG/ML (ref 30–100)
ALBUMIN SERPL BCP-MCNC: 3.4 G/DL (ref 3.5–5)
ALP SERPL-CCNC: 127 U/L (ref 46–116)
ALT SERPL W P-5'-P-CCNC: 25 U/L (ref 12–78)
ANION GAP SERPL CALCULATED.3IONS-SCNC: 1 MMOL/L (ref 4–13)
AST SERPL W P-5'-P-CCNC: 19 U/L (ref 5–45)
BASOPHILS # BLD AUTO: 0.05 THOUSANDS/ΜL (ref 0–0.1)
BASOPHILS NFR BLD AUTO: 1 % (ref 0–1)
BILIRUB SERPL-MCNC: 0.75 MG/DL (ref 0.2–1)
BUN SERPL-MCNC: 18 MG/DL (ref 5–25)
CALCIUM ALBUM COR SERPL-MCNC: 9.3 MG/DL (ref 8.3–10.1)
CALCIUM SERPL-MCNC: 8.8 MG/DL (ref 8.3–10.1)
CHLORIDE SERPL-SCNC: 107 MMOL/L (ref 100–108)
CHOLEST SERPL-MCNC: 195 MG/DL (ref 50–200)
CO2 SERPL-SCNC: 29 MMOL/L (ref 21–32)
CORTIS AM PEAK SERPL-MCNC: 21.8 UG/DL (ref 4.2–22.4)
CREAT SERPL-MCNC: 1 MG/DL (ref 0.6–1.3)
EOSINOPHIL # BLD AUTO: 0.15 THOUSAND/ΜL (ref 0–0.61)
EOSINOPHIL NFR BLD AUTO: 2 % (ref 0–6)
ERYTHROCYTE [DISTWIDTH] IN BLOOD BY AUTOMATED COUNT: 12.3 % (ref 11.6–15.1)
GFR SERPL CREATININE-BSD FRML MDRD: 61 ML/MIN/1.73SQ M
GLUCOSE P FAST SERPL-MCNC: 97 MG/DL (ref 65–99)
HCT VFR BLD AUTO: 38.3 % (ref 34.8–46.1)
HDLC SERPL-MCNC: 39 MG/DL
HGB BLD-MCNC: 12.7 G/DL (ref 11.5–15.4)
IMM GRANULOCYTES # BLD AUTO: 0.03 THOUSAND/UL (ref 0–0.2)
IMM GRANULOCYTES NFR BLD AUTO: 0 % (ref 0–2)
INSULIN SERPL-ACNC: 25.3 MU/L (ref 3–25)
LDLC SERPL CALC-MCNC: 130 MG/DL (ref 0–100)
LYMPHOCYTES # BLD AUTO: 1.9 THOUSANDS/ΜL (ref 0.6–4.47)
LYMPHOCYTES NFR BLD AUTO: 23 % (ref 14–44)
MAGNESIUM SERPL-MCNC: 2.4 MG/DL (ref 1.6–2.6)
MCH RBC QN AUTO: 31 PG (ref 26.8–34.3)
MCHC RBC AUTO-ENTMCNC: 33.2 G/DL (ref 31.4–37.4)
MCV RBC AUTO: 93 FL (ref 82–98)
MONOCYTES # BLD AUTO: 0.6 THOUSAND/ΜL (ref 0.17–1.22)
MONOCYTES NFR BLD AUTO: 7 % (ref 4–12)
NEUTROPHILS # BLD AUTO: 5.57 THOUSANDS/ΜL (ref 1.85–7.62)
NEUTS SEG NFR BLD AUTO: 67 % (ref 43–75)
NONHDLC SERPL-MCNC: 156 MG/DL
NRBC BLD AUTO-RTO: 0 /100 WBCS
PHOSPHATE SERPL-MCNC: 2.7 MG/DL (ref 2.3–4.1)
PLATELET # BLD AUTO: 260 THOUSANDS/UL (ref 149–390)
PMV BLD AUTO: 11 FL (ref 8.9–12.7)
POTASSIUM SERPL-SCNC: 3.8 MMOL/L (ref 3.5–5.3)
PROT SERPL-MCNC: 8.1 G/DL (ref 6.4–8.2)
RBC # BLD AUTO: 4.1 MILLION/UL (ref 3.81–5.12)
RHEUMATOID FACT SER QL LA: NEGATIVE
SODIUM SERPL-SCNC: 137 MMOL/L (ref 136–145)
T3FREE SERPL-MCNC: 2.15 PG/ML (ref 2.3–4.2)
T4 FREE SERPL-MCNC: 1.03 NG/DL (ref 0.76–1.46)
TRIGL SERPL-MCNC: 131 MG/DL
URATE SERPL-MCNC: 5.4 MG/DL (ref 2–6.8)
WBC # BLD AUTO: 8.3 THOUSAND/UL (ref 4.31–10.16)

## 2021-09-27 PROCEDURE — 84439 ASSAY OF FREE THYROXINE: CPT

## 2021-09-27 PROCEDURE — 80053 COMPREHEN METABOLIC PANEL: CPT

## 2021-09-27 PROCEDURE — 86376 MICROSOMAL ANTIBODY EACH: CPT

## 2021-09-27 PROCEDURE — 36415 COLL VENOUS BLD VENIPUNCTURE: CPT

## 2021-09-27 PROCEDURE — 86800 THYROGLOBULIN ANTIBODY: CPT

## 2021-09-27 PROCEDURE — 83520 IMMUNOASSAY QUANT NOS NONAB: CPT

## 2021-09-27 PROCEDURE — 84100 ASSAY OF PHOSPHORUS: CPT

## 2021-09-27 PROCEDURE — 82533 TOTAL CORTISOL: CPT

## 2021-09-27 PROCEDURE — 84206 ASSAY OF PROINSULIN: CPT

## 2021-09-27 PROCEDURE — 84481 FREE ASSAY (FT-3): CPT

## 2021-09-27 PROCEDURE — 84244 ASSAY OF RENIN: CPT

## 2021-09-27 PROCEDURE — 84681 ASSAY OF C-PEPTIDE: CPT

## 2021-09-27 PROCEDURE — 80061 LIPID PANEL: CPT

## 2021-09-27 PROCEDURE — 83735 ASSAY OF MAGNESIUM: CPT

## 2021-09-27 PROCEDURE — 86430 RHEUMATOID FACTOR TEST QUAL: CPT

## 2021-09-27 PROCEDURE — 84550 ASSAY OF BLOOD/URIC ACID: CPT

## 2021-09-27 PROCEDURE — 82306 VITAMIN D 25 HYDROXY: CPT

## 2021-09-27 PROCEDURE — 83525 ASSAY OF INSULIN: CPT

## 2021-09-27 PROCEDURE — 83835 ASSAY OF METANEPHRINES: CPT

## 2021-09-27 PROCEDURE — 86038 ANTINUCLEAR ANTIBODIES: CPT

## 2021-09-27 PROCEDURE — 82088 ASSAY OF ALDOSTERONE: CPT

## 2021-09-27 PROCEDURE — 82024 ASSAY OF ACTH: CPT

## 2021-09-27 PROCEDURE — 85025 COMPLETE CBC W/AUTO DIFF WBC: CPT

## 2021-09-28 ENCOUNTER — APPOINTMENT (OUTPATIENT)
Dept: LAB | Age: 61
End: 2021-09-28
Payer: COMMERCIAL

## 2021-09-28 DIAGNOSIS — E78.5 HYPERLIPIDEMIA, UNSPECIFIED HYPERLIPIDEMIA TYPE: ICD-10-CM

## 2021-09-28 DIAGNOSIS — E66.9 OBESITY, UNSPECIFIED CLASSIFICATION, UNSPECIFIED OBESITY TYPE, UNSPECIFIED WHETHER SERIOUS COMORBIDITY PRESENT: ICD-10-CM

## 2021-09-28 DIAGNOSIS — E03.9 MYXEDEMA HEART DISEASE: ICD-10-CM

## 2021-09-28 DIAGNOSIS — I51.9 MYXEDEMA HEART DISEASE: ICD-10-CM

## 2021-09-28 LAB
ACTH PLAS-MCNC: 29 PG/ML (ref 7.2–63.3)
C PEPTIDE SERPL-MCNC: 4.4 NG/ML (ref 1.1–4.4)
THYROGLOB AB SERPL-ACNC: <1 IU/ML (ref 0–0.9)
THYROPEROXIDASE AB SERPL-ACNC: 9 IU/ML (ref 0–34)

## 2021-09-28 PROCEDURE — 87086 URINE CULTURE/COLONY COUNT: CPT

## 2021-09-29 LAB
BACTERIA UR CULT: NORMAL
PROINSULIN SERPL-SCNC: 7.7 PMOL/L (ref 0–10)
RYE IGE QN: NEGATIVE

## 2021-09-30 ENCOUNTER — HOSPITAL ENCOUNTER (OUTPATIENT)
Dept: RADIOLOGY | Facility: HOSPITAL | Age: 61
Discharge: HOME | End: 2021-09-30
Attending: SPECIALIST
Payer: COMMERCIAL

## 2021-09-30 DIAGNOSIS — I10 ESSENTIAL (PRIMARY) HYPERTENSION: ICD-10-CM

## 2021-09-30 DIAGNOSIS — E34.9 ENDOCRINE DISORDER, UNSPECIFIED: ICD-10-CM

## 2021-09-30 DIAGNOSIS — E03.9 HYPOTHYROIDISM, UNSPECIFIED: ICD-10-CM

## 2021-09-30 DIAGNOSIS — E04.1 NONTOXIC SINGLE THYROID NODULE: ICD-10-CM

## 2021-09-30 LAB
ALDOST SERPL-MCNC: 2.2 NG/DL (ref 0–30)
LEPTIN SERPL-MCNC: 194.2 NG/ML
RENIN PLAS-CCNC: 0.51 NG/ML/HR (ref 0.17–5.38)

## 2021-09-30 PROCEDURE — 75571 CT HRT W/O DYE W/CA TEST: CPT

## 2021-09-30 PROCEDURE — G1004 CDSM NDSC: HCPCS

## 2021-10-01 ENCOUNTER — HOSPITAL ENCOUNTER (OUTPATIENT)
Dept: NON INVASIVE DIAGNOSTICS | Facility: CLINIC | Age: 61
Discharge: HOME | End: 2021-10-01
Payer: COMMERCIAL

## 2021-10-01 DIAGNOSIS — I10 ESSENTIAL HYPERTENSION, MALIGNANT: ICD-10-CM

## 2021-10-01 DIAGNOSIS — E34.9 ENDOCRINE DISORDER, UNSPECIFIED: ICD-10-CM

## 2021-10-01 DIAGNOSIS — E78.5 HYPERLIPIDEMIA, UNSPECIFIED: ICD-10-CM

## 2021-10-01 DIAGNOSIS — E66.8 OBESITY OF ENDOCRINE ORIGIN: ICD-10-CM

## 2021-10-01 DIAGNOSIS — E03.9 HYPOTHYROIDISM, UNSPECIFIED: ICD-10-CM

## 2021-10-01 LAB
METANEPH FREE SERPL-MCNC: <10 PG/ML (ref 0–88)
NORMETANEPHRINE SERPL-MCNC: 63.6 PG/ML (ref 0–191.8)

## 2021-10-01 PROCEDURE — 93970 EXTREMITY STUDY: CPT

## 2021-10-01 PROCEDURE — 93970 EXTREMITY STUDY: CPT | Performed by: SURGERY

## 2021-12-29 ENCOUNTER — OFFICE VISIT (OUTPATIENT)
Dept: GYNECOLOGIC ONCOLOGY | Facility: CLINIC | Age: 61
End: 2021-12-29
Payer: COMMERCIAL

## 2021-12-29 VITALS
HEIGHT: 67 IN | DIASTOLIC BLOOD PRESSURE: 80 MMHG | OXYGEN SATURATION: 97 % | SYSTOLIC BLOOD PRESSURE: 150 MMHG | RESPIRATION RATE: 16 BRPM | WEIGHT: 259 LBS | HEART RATE: 84 BPM | BODY MASS INDEX: 40.65 KG/M2 | TEMPERATURE: 97.3 F

## 2021-12-29 DIAGNOSIS — Z85.42 ENCOUNTER FOR FOLLOW-UP SURVEILLANCE OF ENDOMETRIAL CANCER: Primary | ICD-10-CM

## 2021-12-29 DIAGNOSIS — Z08 ENCOUNTER FOR FOLLOW-UP SURVEILLANCE OF ENDOMETRIAL CANCER: Primary | ICD-10-CM

## 2021-12-29 PROCEDURE — 3008F BODY MASS INDEX DOCD: CPT | Performed by: OBSTETRICS & GYNECOLOGY

## 2021-12-29 PROCEDURE — 99213 OFFICE O/P EST LOW 20 MIN: CPT | Performed by: OBSTETRICS & GYNECOLOGY

## 2021-12-29 PROCEDURE — 1036F TOBACCO NON-USER: CPT | Performed by: OBSTETRICS & GYNECOLOGY

## 2022-01-31 ENCOUNTER — APPOINTMENT (OUTPATIENT)
Dept: LAB | Age: 62
End: 2022-01-31
Payer: COMMERCIAL

## 2022-01-31 DIAGNOSIS — E66.8 OBESITY OF ENDOCRINE ORIGIN: ICD-10-CM

## 2022-01-31 DIAGNOSIS — R73.03 DIABETES MELLITUS, LATENT: ICD-10-CM

## 2022-01-31 DIAGNOSIS — I10 ESSENTIAL HYPERTENSION, MALIGNANT: ICD-10-CM

## 2022-01-31 DIAGNOSIS — E78.49 OTHER HYPERLIPIDEMIA: ICD-10-CM

## 2022-01-31 DIAGNOSIS — E66.01 CLASS 2 SEVERE OBESITY DUE TO EXCESS CALORIES WITH SERIOUS COMORBIDITY IN ADULT, UNSPECIFIED BMI (HCC): ICD-10-CM

## 2022-01-31 DIAGNOSIS — E03.9 MYXEDEMA HEART DISEASE: ICD-10-CM

## 2022-01-31 DIAGNOSIS — I51.9 MYXEDEMA HEART DISEASE: ICD-10-CM

## 2022-01-31 LAB
ALBUMIN SERPL BCP-MCNC: 3.3 G/DL (ref 3.5–5)
ALP SERPL-CCNC: 121 U/L (ref 46–116)
ALT SERPL W P-5'-P-CCNC: 19 U/L (ref 12–78)
ANION GAP SERPL CALCULATED.3IONS-SCNC: 4 MMOL/L (ref 4–13)
AST SERPL W P-5'-P-CCNC: 14 U/L (ref 5–45)
BACTERIA UR QL AUTO: ABNORMAL /HPF
BASOPHILS # BLD AUTO: 0.04 THOUSANDS/ΜL (ref 0–0.1)
BASOPHILS NFR BLD AUTO: 1 % (ref 0–1)
BILIRUB SERPL-MCNC: 0.66 MG/DL (ref 0.2–1)
BILIRUB UR QL STRIP: NEGATIVE
BUN SERPL-MCNC: 21 MG/DL (ref 5–25)
CALCIUM ALBUM COR SERPL-MCNC: 9.3 MG/DL (ref 8.3–10.1)
CALCIUM SERPL-MCNC: 8.7 MG/DL (ref 8.3–10.1)
CHLORIDE SERPL-SCNC: 106 MMOL/L (ref 100–108)
CHOLEST SERPL-MCNC: 169 MG/DL
CLARITY UR: CLEAR
CO2 SERPL-SCNC: 28 MMOL/L (ref 21–32)
COLOR UR: YELLOW
CREAT SERPL-MCNC: 1.05 MG/DL (ref 0.6–1.3)
EOSINOPHIL # BLD AUTO: 0.12 THOUSAND/ΜL (ref 0–0.61)
EOSINOPHIL NFR BLD AUTO: 2 % (ref 0–6)
ERYTHROCYTE [DISTWIDTH] IN BLOOD BY AUTOMATED COUNT: 12.2 % (ref 11.6–15.1)
GFR SERPL CREATININE-BSD FRML MDRD: 57 ML/MIN/1.73SQ M
GLUCOSE P FAST SERPL-MCNC: 96 MG/DL (ref 65–99)
GLUCOSE UR STRIP-MCNC: NEGATIVE MG/DL
HCT VFR BLD AUTO: 38.9 % (ref 34.8–46.1)
HDLC SERPL-MCNC: 35 MG/DL
HGB BLD-MCNC: 12.7 G/DL (ref 11.5–15.4)
HGB UR QL STRIP.AUTO: ABNORMAL
IMM GRANULOCYTES # BLD AUTO: 0.03 THOUSAND/UL (ref 0–0.2)
IMM GRANULOCYTES NFR BLD AUTO: 1 % (ref 0–2)
KETONES UR STRIP-MCNC: NEGATIVE MG/DL
LDLC SERPL CALC-MCNC: 110 MG/DL (ref 0–100)
LEUKOCYTE ESTERASE UR QL STRIP: ABNORMAL
LYMPHOCYTES # BLD AUTO: 1.99 THOUSANDS/ΜL (ref 0.6–4.47)
LYMPHOCYTES NFR BLD AUTO: 34 % (ref 14–44)
MAGNESIUM SERPL-MCNC: 2 MG/DL (ref 1.6–2.6)
MCH RBC QN AUTO: 30.5 PG (ref 26.8–34.3)
MCHC RBC AUTO-ENTMCNC: 32.6 G/DL (ref 31.4–37.4)
MCV RBC AUTO: 93 FL (ref 82–98)
MONOCYTES # BLD AUTO: 0.48 THOUSAND/ΜL (ref 0.17–1.22)
MONOCYTES NFR BLD AUTO: 8 % (ref 4–12)
NEUTROPHILS # BLD AUTO: 3.19 THOUSANDS/ΜL (ref 1.85–7.62)
NEUTS SEG NFR BLD AUTO: 54 % (ref 43–75)
NITRITE UR QL STRIP: NEGATIVE
NON-SQ EPI CELLS URNS QL MICRO: ABNORMAL /HPF
NONHDLC SERPL-MCNC: 134 MG/DL
NRBC BLD AUTO-RTO: 0 /100 WBCS
PH UR STRIP.AUTO: 6 [PH]
PHOSPHATE SERPL-MCNC: 3.4 MG/DL (ref 2.3–4.1)
PLATELET # BLD AUTO: 233 THOUSANDS/UL (ref 149–390)
PMV BLD AUTO: 11.6 FL (ref 8.9–12.7)
POTASSIUM SERPL-SCNC: 3.8 MMOL/L (ref 3.5–5.3)
PROT SERPL-MCNC: 7.3 G/DL (ref 6.4–8.2)
PROT UR STRIP-MCNC: NEGATIVE MG/DL
RBC # BLD AUTO: 4.17 MILLION/UL (ref 3.81–5.12)
RBC #/AREA URNS AUTO: ABNORMAL /HPF
SODIUM SERPL-SCNC: 138 MMOL/L (ref 136–145)
SP GR UR STRIP.AUTO: 1.02 (ref 1–1.03)
T4 FREE SERPL-MCNC: 1.03 NG/DL (ref 0.76–1.46)
TRIGL SERPL-MCNC: 120 MG/DL
TSH SERPL DL<=0.05 MIU/L-ACNC: 1.81 UIU/ML (ref 0.36–3.74)
UROBILINOGEN UR QL STRIP.AUTO: 0.2 E.U./DL
WBC # BLD AUTO: 5.85 THOUSAND/UL (ref 4.31–10.16)
WBC #/AREA URNS AUTO: ABNORMAL /HPF

## 2022-01-31 PROCEDURE — 80053 COMPREHEN METABOLIC PANEL: CPT

## 2022-01-31 PROCEDURE — 83036 HEMOGLOBIN GLYCOSYLATED A1C: CPT

## 2022-01-31 PROCEDURE — 36415 COLL VENOUS BLD VENIPUNCTURE: CPT

## 2022-01-31 PROCEDURE — 83521 IG LIGHT CHAINS FREE EACH: CPT

## 2022-01-31 PROCEDURE — 84165 PROTEIN E-PHORESIS SERUM: CPT | Performed by: PATHOLOGY

## 2022-01-31 PROCEDURE — 85025 COMPLETE CBC W/AUTO DIFF WBC: CPT

## 2022-01-31 PROCEDURE — 84439 ASSAY OF FREE THYROXINE: CPT

## 2022-01-31 PROCEDURE — 84165 PROTEIN E-PHORESIS SERUM: CPT

## 2022-01-31 PROCEDURE — 84100 ASSAY OF PHOSPHORUS: CPT

## 2022-01-31 PROCEDURE — 84443 ASSAY THYROID STIM HORMONE: CPT

## 2022-01-31 PROCEDURE — 81001 URINALYSIS AUTO W/SCOPE: CPT

## 2022-01-31 PROCEDURE — 80061 LIPID PANEL: CPT

## 2022-01-31 PROCEDURE — 83735 ASSAY OF MAGNESIUM: CPT

## 2022-01-31 PROCEDURE — 84166 PROTEIN E-PHORESIS/URINE/CSF: CPT | Performed by: PATHOLOGY

## 2022-02-01 LAB
EST. AVERAGE GLUCOSE BLD GHB EST-MCNC: 111 MG/DL
HBA1C MFR BLD: 5.5 %

## 2022-02-02 LAB
ALBUMIN SERPL ELPH-MCNC: 4.09 G/DL (ref 3.5–5)
ALBUMIN SERPL ELPH-MCNC: 58.4 % (ref 52–65)
ALPHA1 GLOB SERPL ELPH-MCNC: 0.32 G/DL (ref 0.1–0.4)
ALPHA1 GLOB SERPL ELPH-MCNC: 4.5 % (ref 2.5–5)
ALPHA2 GLOB SERPL ELPH-MCNC: 0.85 G/DL (ref 0.4–1.2)
ALPHA2 GLOB SERPL ELPH-MCNC: 12.2 % (ref 7–13)
BETA GLOB ABNORMAL SERPL ELPH-MCNC: 0.31 G/DL (ref 0.4–0.8)
BETA1 GLOB SERPL ELPH-MCNC: 4.4 % (ref 5–13)
BETA2 GLOB SERPL ELPH-MCNC: 5.8 % (ref 2–8)
BETA2+GAMMA GLOB SERPL ELPH-MCNC: 0.41 G/DL (ref 0.2–0.5)
GAMMA GLOB ABNORMAL SERPL ELPH-MCNC: 1.03 G/DL (ref 0.5–1.6)
GAMMA GLOB SERPL ELPH-MCNC: 14.7 % (ref 12–22)
IGG/ALB SER: 1.4 {RATIO} (ref 1.1–1.8)
KAPPA LC FREE SER-MCNC: 30.3 MG/L (ref 3.3–19.4)
KAPPA LC FREE/LAMBDA FREE SER: 1.43 {RATIO} (ref 0.26–1.65)
LAMBDA LC FREE SERPL-MCNC: 21.2 MG/L (ref 5.7–26.3)
PROT PATTERN SERPL ELPH-IMP: ABNORMAL
PROT SERPL-MCNC: 7 G/DL (ref 6.4–8.2)

## 2022-04-11 NOTE — ASSESSMENT & PLAN NOTE
61yo with h/o breast cancer s/p chemo, HTN and stage 1A endometrial cancer presents for surveillance visit    Clinically BRANDEN  Exam benign    >1 year since diagnosis  Continue with every 3-6 months for the first 2 years of surveillance

## 2022-04-11 NOTE — PROGRESS NOTES
Assessment/Plan:    Problem List Items Addressed This Visit        Other    Encounter for follow-up surveillance of endometrial cancer - Primary     63yo with h/o breast cancer s/p chemo, HTN and stage 1A endometrial cancer presents for surveillance visit    Clinically BRANDEN  Exam benign    >1 year since diagnosis  Continue with every 3-6 months for the first 2 years of surveillance  CHIEF COMPLAINT: follow up       Problem:  Cancer Staging  Endometrial cancer [facility])  Staging form: Corpus Uteri - Carcinoma, AJCC 8th Edition  - Clinical: FIGO Stage IA - Signed by Oksana Che MD on 6/14/2021        Previous therapy:  Oncology History   Endometrial cancer ([facility] [address] )   11/9/2020 Initial Diagnosis    Endometrial cancer ([facility] [address] )     2/11/2021 Surgery    RA-TLH/BSO/SLND     6/14/2021 -  Cancer Staged    Staging form: Corpus Uteri - Carcinoma, AJCC 8th Edition  - Clinical: FIGO Stage IA - Signed by Oksana Che MD on 6/14/2021  Histologic grade (G): G1  Histologic grading system: 3 grade system  Lymph-vascular invasion (LVI): LVI present/identified, NOS             Patient ID: Isael Kwan is a 64 y o  female  63yo with h/o breast cancer s/p chemo, HTN and stage 1A endometrial cancer presents for surveillance visit  She did not require adjuvant therapy  Pt denies abdominal bloating/pain/cramping  No vaginal bleeding/discharge  No changes in BM/urination  No early satiety/Appetite adequate  Denies significant weight loss/weight gain  The following portions of the patient's history were reviewed and updated as appropriate: allergies, current medications, past family history, past medical history, past social history, past surgical history and problem list     Review of Systems   Constitutional: Negative for appetite change, chills, fatigue and fever  Respiratory: Negative for chest tightness and shortness of breath      Gastrointestinal: Negative for abdominal distention, abdominal pain, constipation, diarrhea and nausea  Genitourinary: Negative for difficulty urinating, flank pain, frequency, urgency, vaginal bleeding, vaginal discharge and vaginal pain  Musculoskeletal: Negative for back pain, joint swelling and myalgias  Skin: Negative for rash  Neurological: Negative for dizziness, light-headedness, numbness and headaches  Current Outpatient Medications   Medication Sig Dispense Refill    aspirin (Aspirin Low Dose) 81 mg chewable tablet Chew 81 mg      calcium citrate-vitamin D (CITRACAL+D) 315-200 MG-UNIT per tablet Take by mouth daily       carvedilol (COREG CR) 20 MG 24 hr capsule 20 mg every morning      Cholecalciferol (VITAMIN D3) 10 MCG (400 UNIT) CAPS Take 2,000 Units by mouth daily  (Patient not taking: Reported on 12/29/2021 )      Cholecalciferol 250 MCG (16065 UT) CAPS Take 1,000 Units by mouth daily      Cod Liver Oil 10 MINIM CAPS Take by mouth daily        irbesartan (AVAPRO) 75 mg tablet Take 1 tablet (75 mg total) by mouth daily at bedtime (Patient taking differently: Take 150 mg by mouth daily at bedtime ) 30 tablet 0    levothyroxine 50 mcg tablet Take 1 tablet (50 mcg total) by mouth daily in the early morning 90 tablet 1    Lutein 40 MG CAPS Take by mouth daily  (Patient not taking: Reported on 9/22/2021)      Multiple Vitamin (MULTIVITAMIN IRON-FREE PO) Take by mouth      Omega-3 Fatty Acids (Fish Oil) 1200 MG CAPS Take by mouth daily (Patient not taking: Reported on 12/29/2021 )      omeprazole (PriLOSEC) 20 mg delayed release capsule Take 20 mg by mouth daily       No current facility-administered medications for this visit  Objective: There were no vitals taken for this visit  There is no height or weight on file to calculate BMI  There is no height or weight on file to calculate BSA  Physical Exam  HENT:      Head: Normocephalic and atraumatic  Cardiovascular:      Rate and Rhythm: Normal rate and regular rhythm  Pulmonary:      Effort: Pulmonary effort is normal    Abdominal:      General: There is no distension  Palpations: Abdomen is soft  There is no mass  Genitourinary:     Comments: The external female genitalia is normal  The bartholin's, uretheral and skenes glands are normal  The urethral meatus is normal (midline with no lesions)  Anus without fissure or lesion  Speculum exam reveals a grossly normal vagina cuff  No masses, lesions,discharge or bleeding  No significant cystocele or rectocele noted  Bimanual exam notes a surgical absent cervix, uterus and adnexal structures  No masses or fullness  Bladder is without fullness, mass or tenderness  Musculoskeletal:         General: Normal range of motion  Cervical back: Normal range of motion  Skin:     General: Skin is warm and dry  Neurological:      Mental Status: She is alert and oriented to person, place, and time             Lab Results   Component Value Date     07/23/2014    K 3 8 01/31/2022     01/31/2022    CO2 28 01/31/2022    ANIONGAP 8 07/23/2014    BUN 21 01/31/2022    CREATININE 1 05 01/31/2022    GLUCOSE 99 07/23/2014    GLUF 96 01/31/2022    CALCIUM 8 7 01/31/2022    CORRECTEDCA 9 3 01/31/2022    AST 14 01/31/2022    ALT 19 01/31/2022    ALKPHOS 121 (H) 01/31/2022    PROT 7 6 07/23/2014    BILITOT 0 25 07/23/2014    EGFR 57 01/31/2022     Lab Results   Component Value Date    WBC 5 85 01/31/2022    HGB 12 7 01/31/2022    HCT 38 9 01/31/2022    MCV 93 01/31/2022     01/31/2022     Lab Results   Component Value Date    NEUTROABS 3 19 01/31/2022        Trend:  No results found for:

## 2022-04-12 ENCOUNTER — OFFICE VISIT (OUTPATIENT)
Dept: GYNECOLOGIC ONCOLOGY | Facility: CLINIC | Age: 62
End: 2022-04-12
Payer: COMMERCIAL

## 2022-04-12 VITALS
TEMPERATURE: 98.4 F | OXYGEN SATURATION: 96 % | RESPIRATION RATE: 16 BRPM | HEART RATE: 80 BPM | DIASTOLIC BLOOD PRESSURE: 92 MMHG | BODY MASS INDEX: 41.14 KG/M2 | SYSTOLIC BLOOD PRESSURE: 134 MMHG | HEIGHT: 66 IN | WEIGHT: 256 LBS

## 2022-04-12 DIAGNOSIS — Z85.42 ENCOUNTER FOR FOLLOW-UP SURVEILLANCE OF ENDOMETRIAL CANCER: Primary | ICD-10-CM

## 2022-04-12 DIAGNOSIS — Z08 ENCOUNTER FOR FOLLOW-UP SURVEILLANCE OF ENDOMETRIAL CANCER: Primary | ICD-10-CM

## 2022-04-12 PROCEDURE — 99213 OFFICE O/P EST LOW 20 MIN: CPT | Performed by: OBSTETRICS & GYNECOLOGY

## 2022-04-12 PROCEDURE — 1036F TOBACCO NON-USER: CPT | Performed by: OBSTETRICS & GYNECOLOGY

## 2022-04-12 PROCEDURE — 3008F BODY MASS INDEX DOCD: CPT | Performed by: OBSTETRICS & GYNECOLOGY

## 2022-11-01 ENCOUNTER — OFFICE VISIT (OUTPATIENT)
Dept: GYNECOLOGIC ONCOLOGY | Facility: CLINIC | Age: 62
End: 2022-11-01

## 2022-11-01 VITALS
HEIGHT: 66 IN | WEIGHT: 256 LBS | RESPIRATION RATE: 16 BRPM | DIASTOLIC BLOOD PRESSURE: 86 MMHG | TEMPERATURE: 97.4 F | BODY MASS INDEX: 41.14 KG/M2 | OXYGEN SATURATION: 98 % | SYSTOLIC BLOOD PRESSURE: 138 MMHG | HEART RATE: 98 BPM

## 2022-11-01 DIAGNOSIS — Z08 ENCOUNTER FOR FOLLOW-UP SURVEILLANCE OF ENDOMETRIAL CANCER: Primary | ICD-10-CM

## 2022-11-01 DIAGNOSIS — Z85.42 ENCOUNTER FOR FOLLOW-UP SURVEILLANCE OF ENDOMETRIAL CANCER: Primary | ICD-10-CM

## 2022-11-01 RX ORDER — IRBESARTAN 150 MG/1
150 TABLET ORAL DAILY
COMMUNITY
Start: 2022-10-05

## 2022-11-01 NOTE — ASSESSMENT & PLAN NOTE
63yo with h/o breast cancer s/p chemo, HTN and stage 1A endometrial cancer presents for surveillance visit  Clinically BRANDEN  Exam benign    Again addressed signs/symptoms of concern  D/w pt taht if GI symptoms persist/return to consider imaging but given resolution will continue to monitor for now  Pt now 2 years from diagnosis  Continue with q6-12month surveillance visits

## 2022-11-01 NOTE — PROGRESS NOTES
Assessment/Plan:    Problem List Items Addressed This Visit        Other    Encounter for follow-up surveillance of endometrial cancer - Primary     63yo with h/o breast cancer s/p chemo, HTN and stage 1A endometrial cancer presents for surveillance visit  Clinically BRANDEN  Exam benign    Again addressed signs/symptoms of concern  D/w pt taht if GI symptoms persist/return to consider imaging but given resolution will continue to monitor for now  Pt now 2 years from diagnosis  Continue with q6-12month surveillance visits  CHIEF COMPLAINT: follow up       Problem:  Cancer Staging  Endometrial cancer [facility])  Staging form: Corpus Uteri - Carcinoma, AJCC 8th Edition  - Clinical: FIGO Stage IA - Signed by Gaynell Apgar, MD on 2021        Previous therapy:  Oncology History   Endometrial cancer ([facility] [address] )   2020 Initial Diagnosis    Endometrial cancer ([facility] [address] )     2021 Surgery    RA-TLH/BSO/SLND     2021 -  Cancer Staged    Staging form: Corpus Uteri - Carcinoma, AJCC 8th Edition  - Clinical: FIGO Stage IA - Signed by Gaynell Apgar, MD on 2021  Histologic grade (G): G1  Histologic grading system: 3 grade system  Lymph-vascular invasion (LVI): LVI present/identified, NOS             Patient ID: Jai Lundy is a 58 y o  female  63yo with h/o breast cancer s/p chemo, HTN and stage 1A endometrial cancer presents for surveillance visit  She did not require adjuvant therapy  Pt reports reports 1-2 weeks of GI disturbances with diarrhea and bloating  This has now since resolved and she feels was related most likely to eating or a viral illness  Denies any vaginal bleeding  No pelvic pain or discomfort  No changes in weight    No changes in bowel movements or urination      The following portions of the patient's history were reviewed and updated as appropriate: allergies, current medications, past family history, past medical history, past social history, past surgical history and problem list     Review of Systems   Constitutional: Negative for appetite change, chills, fatigue and fever  Respiratory: Negative for chest tightness and shortness of breath  Gastrointestinal: Negative for abdominal distention, abdominal pain, constipation, diarrhea and nausea  Genitourinary: Negative for difficulty urinating, flank pain, frequency, urgency, vaginal bleeding, vaginal discharge and vaginal pain  Musculoskeletal: Negative for back pain, joint swelling and myalgias  Skin: Negative for rash  Neurological: Negative for dizziness, light-headedness, numbness and headaches  Current Outpatient Medications   Medication Sig Dispense Refill   • calcium citrate-vitamin D (CITRACAL+D) 315-200 MG-UNIT per tablet Take by mouth daily      • carvedilol (COREG CR) 20 MG 24 hr capsule 20 mg every morning     • Cholecalciferol 250 MCG (12420 UT) CAPS Take 1,000 Units by mouth daily     • Cod Liver Oil 10 MINIM CAPS Take by mouth daily       • irbesartan (AVAPRO) 150 mg tablet Take 150 mg by mouth daily     • Multiple Vitamin (MULTIVITAMIN IRON-FREE PO) Take by mouth     • omeprazole (PriLOSEC) 20 mg delayed release capsule Take 20 mg by mouth daily     • aspirin 81 mg chewable tablet Chew 81 mg (Patient not taking: No sig reported)     • Cholecalciferol (VITAMIN D3) 10 MCG (400 UNIT) CAPS Take 2,000 Units by mouth daily  (Patient not taking: No sig reported)     • irbesartan (AVAPRO) 75 mg tablet Take 1 tablet (75 mg total) by mouth daily at bedtime (Patient not taking: Reported on 11/1/2022) 30 tablet 0   • levothyroxine 50 mcg tablet Take 1 tablet (50 mcg total) by mouth daily in the early morning 90 tablet 1   • Lutein 40 MG CAPS Take by mouth daily  (Patient not taking: No sig reported)     • Omega-3 Fatty Acids (Fish Oil) 1200 MG CAPS Take by mouth daily (Patient not taking: No sig reported)       No current facility-administered medications for this visit             Objective:    Blood pressure 138/86, pulse 98, temperature (!) 97 4 °F (36 3 °C), temperature source Temporal, resp  rate 16, height 5' 6" (1 676 m), weight 116 kg (256 lb), SpO2 98 %  Body mass index is 41 32 kg/m²  Body surface area is 2 22 meters squared  Physical Exam  HENT:      Head: Normocephalic and atraumatic  Cardiovascular:      Rate and Rhythm: Normal rate and regular rhythm  Pulmonary:      Effort: Pulmonary effort is normal    Abdominal:      General: There is no distension  Palpations: Abdomen is soft  There is no mass  Genitourinary:     Comments: The external female genitalia is normal  The bartholin's, uretheral and skenes glands are normal  The urethral meatus is normal (midline with no lesions)  Anus without fissure or lesion  Speculum exam reveals a grossly normal vagina cuff  No masses, lesions,discharge or bleeding  No significant cystocele or rectocele noted  Bimanual exam notes a surgical absent cervix, uterus and adnexal structures  No masses or fullness  Bladder is without fullness, mass or tenderness  Musculoskeletal:         General: Normal range of motion  Cervical back: Normal range of motion  Skin:     General: Skin is warm and dry  Neurological:      Mental Status: She is alert and oriented to person, place, and time             Lab Results   Component Value Date     07/23/2014    K 3 8 01/31/2022     01/31/2022    CO2 28 01/31/2022    ANIONGAP 8 07/23/2014    BUN 21 01/31/2022    CREATININE 1 05 01/31/2022    GLUCOSE 99 07/23/2014    GLUF 96 01/31/2022    CALCIUM 8 7 01/31/2022    CORRECTEDCA 9 3 01/31/2022    AST 14 01/31/2022    ALT 19 01/31/2022    ALKPHOS 121 (H) 01/31/2022    PROT 7 6 07/23/2014    BILITOT 0 25 07/23/2014    EGFR 57 01/31/2022     Lab Results   Component Value Date    WBC 5 85 01/31/2022    HGB 12 7 01/31/2022    HCT 38 9 01/31/2022    MCV 93 01/31/2022     01/31/2022     Lab Results   Component Value Date    NEUTROABS 3 19 01/31/2022  Trend:  No results found for:

## 2023-01-23 ENCOUNTER — TELEPHONE (OUTPATIENT)
Dept: FAMILY MEDICINE CLINIC | Facility: CLINIC | Age: 63
End: 2023-01-23

## 2023-02-10 ENCOUNTER — TELEPHONE (OUTPATIENT)
Dept: ADMINISTRATIVE | Facility: OTHER | Age: 63
End: 2023-02-10

## 2023-02-10 ENCOUNTER — OFFICE VISIT (OUTPATIENT)
Dept: FAMILY MEDICINE CLINIC | Facility: CLINIC | Age: 63
End: 2023-02-10

## 2023-02-10 VITALS
WEIGHT: 255.2 LBS | HEART RATE: 80 BPM | HEIGHT: 67 IN | DIASTOLIC BLOOD PRESSURE: 80 MMHG | SYSTOLIC BLOOD PRESSURE: 130 MMHG | BODY MASS INDEX: 40.06 KG/M2 | OXYGEN SATURATION: 98 % | TEMPERATURE: 97.3 F | RESPIRATION RATE: 18 BRPM

## 2023-02-10 DIAGNOSIS — R10.84 GENERALIZED ABDOMINAL PAIN: ICD-10-CM

## 2023-02-10 DIAGNOSIS — I10 HYPERTENSION, UNSPECIFIED TYPE: Primary | ICD-10-CM

## 2023-02-10 DIAGNOSIS — E03.9 HYPOTHYROIDISM, UNSPECIFIED TYPE: ICD-10-CM

## 2023-02-10 DIAGNOSIS — Z78.0 POST-MENOPAUSAL: ICD-10-CM

## 2023-02-10 DIAGNOSIS — C50.911 MALIGNANT NEOPLASM OF RIGHT FEMALE BREAST, UNSPECIFIED ESTROGEN RECEPTOR STATUS, UNSPECIFIED SITE OF BREAST (HCC): ICD-10-CM

## 2023-02-10 DIAGNOSIS — Z12.31 ENCOUNTER FOR SCREENING MAMMOGRAM FOR BREAST CANCER: ICD-10-CM

## 2023-02-10 DIAGNOSIS — R42 VERTIGO: ICD-10-CM

## 2023-02-10 DIAGNOSIS — E78.5 HYPERLIPIDEMIA, UNSPECIFIED HYPERLIPIDEMIA TYPE: ICD-10-CM

## 2023-02-10 PROBLEM — E73.9 LACTOSE INTOLERANCE: Status: ACTIVE | Noted: 2023-02-07

## 2023-02-10 PROBLEM — F41.9 ANXIETY: Status: ACTIVE | Noted: 2022-02-10

## 2023-02-10 PROBLEM — Z86.16 HISTORY OF COVID-19: Status: ACTIVE | Noted: 2022-09-14

## 2023-02-10 NOTE — ASSESSMENT & PLAN NOTE
Dx in 2005   S/p chemo, lumpectomy, and radiation  Follows with Dr Vianney Pereira and Dr Marilou Anderson  Completed 5 years of tamoxifen  Due for Mammo but fears the worse   Strongly recommend Mammo but respect her decision not to screen at this time  Did say she would eventually screen this year   Script provided

## 2023-02-10 NOTE — TELEPHONE ENCOUNTER
----- Message from Elvin Schaumann sent at 2/10/2023 11:07 AM EST -----  Regarding: Care Gap Request  02/10/23 11:07 AM    Hello, our patient attached above has had CRC: Colonoscopy completed/performed  Please assist in updating the patient chart by making an External outreach to Dr Shantel Diaz facility located in [address] [place] [address] Halie Mendoza 3 [phone]  The date of service is 05/26/2021      Thank you,  Elvin Schaumann  [facility] [place] RAMIREZ

## 2023-02-10 NOTE — LETTER
Procedure Request Form: Colonoscopy      Date Requested: 23  Patient: Doyle Ram  Patient : 1960   Referring Provider: Jaelyn Marcos, MD        Date of Procedure ______________________________       The above patient has informed us that they have completed their   most recent Colonoscopy at your facility  Please complete   this form and attach all corresponding procedure reports/results  Comments __________________________________________________________  ____________________________________________________________________  ____________________________________________________________________  ____________________________________________________________________    Facility Completing Procedure _________________________________________    Form Completed By (print name) _______________________________________      Signature __________________________________________________________      These reports are needed for  compliance  Please fax this completed form and a copy of the procedure report to our office located at [place] [address] as soon as possible to Fax [phone] attention Kirsten: Phone [phone]    We thank you for your assistance in treating our mutual patient

## 2023-02-11 NOTE — PROGRESS NOTES
Name: Zander Hamm      : 1960      MRN: [id number]  Encounter Provider: Tanner Pettit MD  Encounter Date: 2/10/2023   Encounter department: [facility]     1  Hypertension, unspecified type  Assessment & Plan:  Bp at goal  Check CMP and urine PCR     Orders:  -     Comprehensive metabolic panel; Future  -     Microalbumin / creatinine urine ratio    2  Encounter for screening mammogram for breast cancer  -     Mammo screening bilateral w 3d & cad; Future; Expected date: 02/10/2023    3  Malignant neoplasm of right female breast, unspecified estrogen receptor status, unspecified site of breast [facility])  Assessment & Plan:  Dx in    S/p chemo, lumpectomy, and radiation  Follows with Dr Humberto Webb and Dr Marisela Jacobo  Completed 5 years of tamoxifen  Due for Mammo but fears the worse   Strongly recommend Mammo but respect her decision not to screen at this time  Did say she would eventually screen this year   Script provided       4  Vertigo  Assessment & Plan:  Following the ednometrial srgery in   Worse when she truns her had to the left  Mild      5  Hypothyroidism, unspecified type  -     TSH, 3rd generation with Free T4 reflex; Future    6  Post-menopausal  Comments:  S p hysterectomy  Last DEXA was in  and normal, Due for screening now that she's postmenopausal    Orders:  -     DXA bone density spine hip and pelvis; Future; Expected date: 02/10/2023    7  Hyperlipidemia, unspecified hyperlipidemia type  -     Lipid panel; Future  -     Comprehensive metabolic panel; Future    8  Generalized abdominal pain  Comments: Following recent dietary changes  Saw GI and is back on the FODMAP diet  Care per GI       BMI Counseling: Body mass index is 39 97 kg/m²   The BMI is above normal  Nutrition recommendations include encouraging healthy choices of fruits and vegetables, decreasing fast food intake, limiting drinks that contain sugar, moderation in carbohydrate intake, increasing intake of lean protein, reducing intake of saturated and trans fat and reducing intake of cholesterol  Exercise recommendations include moderate physical activity 150 minutes/week  No pharmacotherapy was ordered  Patient referred to PCP  Rationale for BMI follow-up plan is due to patient being overweight or obese  Depression Screening and Follow-up Plan: Patient was screened for depression during today's encounter  They screened negative with a PHQ-2 score of 0  Subjective      Here as a new patient  History of breast cancer and endometrial cancer  Currently in remission  Last PCP Dr Manpreet Lucero with [facility] [place]  Was following a FODMOP diet for previous GI symptoms  Patient states she was in [place] for 9 days and did not follow the diet  Since then, she has had bloating, distention, and pain  Saw Dr Ruslan Stanton, GI, recently and is back on the FODMAP diet  UTD for colonoscopy ( completed in 2021)     Review of Systems   Gastrointestinal: Positive for abdominal distention, abdominal pain, constipation (mostly ) and diarrhea  Negative for vomiting         Current Outpatient Medications on File Prior to Visit   Medication Sig   • aspirin 81 mg chewable tablet Chew 81 mg   • calcium citrate-vitamin D (CITRACAL+D) 315-200 MG-UNIT per tablet Take by mouth daily    • carvedilol (COREG CR) 20 MG 24 hr capsule 20 mg every morning   • Cholecalciferol 250 MCG (62522 UT) CAPS Take 1,000 Units by mouth daily   • irbesartan (AVAPRO) 150 mg tablet Take 150 mg by mouth daily   • levothyroxine 50 mcg tablet Take 1 tablet (50 mcg total) by mouth daily in the early morning   • Multiple Vitamin (MULTIVITAMIN IRON-FREE PO) Take by mouth   • Multiple Vitamins-Minerals (ZINC PO)    • [DISCONTINUED] Cholecalciferol (VITAMIN D3) 10 MCG (400 UNIT) CAPS Take 2,000 Units by mouth daily  (Patient not taking: No sig reported)   • [DISCONTINUED] Cod Liver Oil 10 MINIM CAPS Take by mouth daily     • [DISCONTINUED] irbesartan (AVAPRO) 75 mg tablet Take 1 tablet (75 mg total) by mouth daily at bedtime (Patient not taking: Reported on 11/1/2022)   • [DISCONTINUED] Lutein 40 MG CAPS Take by mouth daily  (Patient not taking: No sig reported)   • [DISCONTINUED] Omega-3 Fatty Acids (Fish Oil) 1200 MG CAPS Take by mouth daily (Patient not taking: No sig reported)   • [DISCONTINUED] omeprazole (PriLOSEC) 20 mg delayed release capsule Take 20 mg by mouth daily       Objective     /80 (BP Location: Left arm, Patient Position: Sitting, Cuff Size: Large)   Pulse 80   Temp (!) 97 3 °F (36 3 °C) (Tympanic)   Resp 18   Ht 5' 7" (1 702 m)   Wt 116 kg (255 lb 3 2 oz)   SpO2 98%   BMI 39 97 kg/m²     Physical Exam  Vitals reviewed  Constitutional:       General: She is not in acute distress  Appearance: Normal appearance  She is not ill-appearing or toxic-appearing  HENT:      Head: Normocephalic and atraumatic  Right Ear: Tympanic membrane normal       Left Ear: Tympanic membrane normal    Eyes:      Extraocular Movements: Extraocular movements intact  Cardiovascular:      Rate and Rhythm: Normal rate and regular rhythm  Heart sounds: No murmur heard  Pulmonary:      Effort: Pulmonary effort is normal  No respiratory distress  Breath sounds: No stridor  No wheezing, rhonchi or rales  Abdominal:      General: There is no distension  Palpations: There is no mass  Tenderness: There is no abdominal tenderness  There is no guarding  Hernia: No hernia is present  Musculoskeletal:      Right lower leg: No edema  Left lower leg: No edema  Lymphadenopathy:      Cervical: No cervical adenopathy  Skin:     General: Skin is warm  Neurological:      Mental Status: She is alert and oriented to person, place, and time  Psychiatric:         Mood and Affect: Mood normal          Behavior: Behavior normal          Thought Content:  Thought content normal          Judgment: Judgment normal        Sera Mendieta, MD

## 2023-02-14 NOTE — TELEPHONE ENCOUNTER
Upon review of the In Basket request and the patient's chart, initial outreach has been made via fax to facility  Please see Contacts section for details       Thank you  Jose Luis Farrell MA

## 2023-02-16 NOTE — TELEPHONE ENCOUNTER
Upon review of the In Basket request we were able to locate, review, and update the patient chart as requested for CRC: Colonoscopy  Any additional questions or concerns should be emailed to the Practice Liaisons via the appropriate education email address, please do not reply via In Basket      Thank you  Cristy Garnica MA

## 2023-03-06 ENCOUNTER — TELEPHONE (OUTPATIENT)
Dept: GYNECOLOGIC ONCOLOGY | Facility: CLINIC | Age: 63
End: 2023-03-06

## 2023-03-29 ENCOUNTER — NURSE TRIAGE (OUTPATIENT)
Dept: OTHER | Facility: OTHER | Age: 63
End: 2023-03-29

## 2023-03-29 ENCOUNTER — APPOINTMENT (EMERGENCY)
Dept: NON INVASIVE DIAGNOSTICS | Facility: HOSPITAL | Age: 63
End: 2023-03-29

## 2023-03-29 ENCOUNTER — HOSPITAL ENCOUNTER (EMERGENCY)
Facility: HOSPITAL | Age: 63
Discharge: HOME | End: 2023-03-29
Attending: EMERGENCY MEDICINE

## 2023-03-29 VITALS
OXYGEN SATURATION: 99 % | SYSTOLIC BLOOD PRESSURE: 168 MMHG | RESPIRATION RATE: 18 BRPM | DIASTOLIC BLOOD PRESSURE: 85 MMHG | HEART RATE: 98 BPM | TEMPERATURE: 98 F

## 2023-03-29 DIAGNOSIS — R20.0 NUMBNESS OF LEFT FOOT: Primary | ICD-10-CM

## 2023-03-29 LAB
ATRIAL RATE: 86 BPM
D DIMER PPP FEU-MCNC: 1.27 UG/ML FEU
P AXIS: 50 DEGREES
PR INTERVAL: 166 MS
QRS AXIS: 42 DEGREES
QRSD INTERVAL: 90 MS
QT INTERVAL: 360 MS
QTC INTERVAL: 430 MS
T WAVE AXIS: 27 DEGREES
VENTRICULAR RATE: 86 BPM

## 2023-03-29 NOTE — ED ATTENDING ATTESTATION
"Final Diagnoses:     1  Numbness of left foot      ED Course as of 03/29/23 1930   Wed Mar 29, 2023   1545 D-Dimer, Quant(!): 1 27       I, Omer Marquez MD, saw and evaluated the patient  All available labs and X-rays were ordered by me or the resident and have been reviewed by myself  I discussed the patient with the resident / non-physician and agree with the resident's / non-physician practitioner's findings and plan as documented in the resident's / non-physician practicitioner's note, except where noted  At this point, I agree with the current assessment done in the ED  I was present during key portions of all procedures performed unless otherwise stated  Nursing Triage:     Chief Complaint   Patient presents with   • Medical Problem     Pt reports having L foot numbness since Monday- was seen by chiropractor and now just having pins and needles feeling since then  Now reporting chills and shakiness  Pt states she has \"occasional chest tightness\" with the last occurrence about 15 minutes ago  HPI:   This is a 58 y o  female presenting for evaluation of left dorsum of foot numbness  The patient states that she has had back pain for years, she follows with a chiropractor for this, and then on Monday she noticed that the left foot felt numb but just on the dorsum  It was not more proximal, did not impact her ability to walk, it was not on the plantar aspect  Is very localized  She also then had a pins and needle sensation as a got better and then it completely resolved  Today it again happened so came in for evaluation  No fevers chills nausea vomiting chest pain shortness of breath dizziness lightheadedness falls trauma ankle inversion injuries  No new back pain, is the same that she always has  She had no recent back injections  She attempt some type of procedure where it was a pulsation on the lower back with some mechanical device  No bowel or bladder incontinence    She does have " a history of breast cancer and uterine cancer but it was not metastatic, more than 2 years ago  No night sweats, no unintentional weight loss or B symptoms  No saddle anesthesia, no changes in what wiping with toilet paper  No direct trauma to the back  No recurrent steroids  ASSESSMENT + PLAN:   Chronic back pain that is unchanged with left dorsum of foot numbness  I do not think this is something life-threatening like cauda equina or epidural abscess  She has a very reassuring exam   The numbness is very localized to the distal aspect of the superficial peroneal nerve  There is no first webspace absence of sensation, no signs of incontinence or urinary retention, I do not think this is a central process  The patient is specifically concerned if this is a DVT, she was sent in for rule out of that  My suspicion is extremely low risk for this so we will do just a D-dimer  Likely discharge, follow-up primary care, return for worsening  Not true radiculopathy, likely just peripheral neuropathy  Physical:   General: VS reviewed  Appears in NAD  awake, alert  Well-nourished, well-developed  Appears stated age  Speaking normally in full sentences  Head: Normocephalic, atraumatic  Eyes: EOM-I  No diplopia  No hyphema  No subconjunctival hemorrhages  Symmetrical lids  ENT: Atraumatic external nose and ears  MMM  No malocclusion  No stridor  Normal phonation  No drooling  Normal swallowing  Neck: No JVD  CV: No pallor noted  Lungs:   No tachypnea  No respiratory distress  Abd: soft nt nd no rebound/guarding  MSK:   FROM spontaneously  No echcymosis cap refill bilaterally equal, less than 2 seconds, dorsalis pedis pulses bilaterally equal 2+  Sensation on right dorsum intact, on left dorsum is reduced  On the lateral aspect of the lateral malleolus sensation is intact  No saddle anesthesia  Normal gait  Skin: Dry, intact  Neuro: Awake, alert, GCS15, CN II-XII grossly intact     Motor grossly intact  Psychiatric/Behavioral: interacting normally; appropriate mood/affect   Exam: deferred    Vitals:    03/29/23 1304   BP: 168/85   Pulse: 98   Resp: 18   Temp: 98 °F (36 7 °C)   SpO2: 99%     - There are no obvious limitations to social determinants of care  - Nursing note reviewed  - Vitals reviewed  - Orders placed by myself and/or advanced practitioner / resident     - Previous chart was reviewed  - No language barrier    - History obtained from patient,    - There are no limitations to the history obtained:     Past Medical:    has a past medical history of Breast cancer in female [facility]), Heart murmur, History of urethral narrowing, Hypertension, and Kidney stones  Past Surgical:    has a past surgical history that includes REMOVAL VENOUS PORT (PORT-A-CATH); pr hysteroscopy bx endometrium&/polypc w/wo d&c (N/A, 11/9/2020); pr hysteroscopy removal leiomyomata (N/A, 11/9/2020); Breast lumpectomy (Right); Breast biopsy (Right); and pr laps total hysterect 250 gm/< w/rmvl tube/ovary (N/A, 2/11/2021)  Social:     Social History     Substance and Sexual Activity   Alcohol Use Not Currently     Social History     Tobacco Use   Smoking Status Never   Smokeless Tobacco Never     Social History     Substance and Sexual Activity   Drug Use Not Currently       Echo:   Results for orders placed in visit on 05/25/18    Echo complete with contrast if indicated    No results found for this or any previous visit  Cath:    No results found for this or any previous visit  Code Status: No Order  Advance Directive and Living Will:      Power of :    POLST:    Medications - No data to display  VAS lower limb venous duplex study, unilateral/limited   ED Interpretation   Neg per tech          Orders Placed This Encounter   Procedures   • D-dimer, quantitative   • ECG 12 lead     Labs Reviewed   D-DIMER, QUANTITATIVE - Abnormal       Result Value Ref Range Status    D-Dimer, Quant 1 27 (*) <0 50 ug/ml FEU Final    Comment: Reference and upper limits to exclude DVT and PE are the same  Do not use to exclude if clinical symptoms are present  Pregnant women:  1st trimester:  <0 22 - 1 06 ug/ml FEU  2nd trimester:  <0 22 - 1 88 ug/ml FEU  3rd trimester:   0 24 - 3 28 ug/ml FEU    Note: Normal ranges may not apply to patients who are transgender, non-binary, or whose legal sex, sex at birth, and gender identity differ  Narrative: In the evaluation for possible pulmonary embolism, in the appropriate (Well's Score of 4 or less) patient, the age adjusted d-dimer cutoff for this patient can be calculated as:    Age x 0 01 (in ug/mL) for Age-adjusted D-dimer exclusion threshold for a patient over 50 years  Time reflects when diagnosis was documented in both MDM as applicable and the Disposition within this note     Time User Action Codes Description Comment    3/29/2023  3:44 PM Marty Singh Add [R20 0] Numbness of left foot       ED Disposition     ED Disposition   Discharge    Condition   Stable    Date/Time   Wed Mar 29, 2023  3:44 PM    Comment   Gabrielle Joseph discharge to home/self care                 Follow-up Information     Follow up With Specialties Details Why [address] Deshawn Mcfadden MD Family Medicine   [address] [address]  [address] [facility] [address] [phone]          Discharge Medication List as of 3/29/2023  3:45 PM      CONTINUE these medications which have NOT CHANGED    Details   aspirin 81 mg chewable tablet Chew 81 mg, Historical Med      calcium citrate-vitamin D (CITRACAL+D) 315-200 MG-UNIT per tablet Take by mouth daily , Historical Med      carvedilol (COREG CR) 20 MG 24 hr capsule 20 mg every morning, Starting Fri 7/10/2020, Historical Med      Cholecalciferol 250 MCG (75827 UT) CAPS Take 1,000 Units by mouth daily, Historical Med      irbesartan (AVAPRO) 150 mg tablet Take 150 mg by mouth daily, Starting Wed 10/5/2022, Historical Med "  levothyroxine 50 mcg tablet Take 1 tablet (50 mcg total) by mouth daily in the early morning, Starting 2021, Until Fri 2/10/2023, Normal      Multiple Vitamin (MULTIVITAMIN IRON-FREE PO) Take by mouth, Historical Med      Multiple Vitamins-Minerals (ZINC PO) Starting 2022, Historical Med           No discharge procedures on file  Prior to Admission Medications   Prescriptions Last Dose Informant Patient Reported? Taking? Cholecalciferol 250 MCG (40282 UT) CAPS   Yes No   Sig: Take 1,000 Units by mouth daily   Multiple Vitamin (MULTIVITAMIN IRON-FREE PO)   Yes No   Sig: Take by mouth   Multiple Vitamins-Minerals (ZINC PO)   Yes No   aspirin 81 mg chewable tablet   Yes No   Sig: Chew 81 mg   calcium citrate-vitamin D (CITRACAL+D) 315-200 MG-UNIT per tablet   Yes No   Sig: Take by mouth daily    carvedilol (COREG CR) 20 MG 24 hr capsule   Yes No   Si mg every morning   irbesartan (AVAPRO) 150 mg tablet   Yes No   Sig: Take 150 mg by mouth daily   levothyroxine 50 mcg tablet   No No   Sig: Take 1 tablet (50 mcg total) by mouth daily in the early morning      Facility-Administered Medications: None                        Portions of the record may have been created with voice recognition software  Occasional wrong word or \"sound a like\" substitutions may have occurred due to the inherent limitations of voice recognition software  Read the chart carefully and recognize, using context, where substitutions have occurred      Electronically signed by:  Gabriela Pollock    "

## 2023-03-29 NOTE — TELEPHONE ENCOUNTER
"Regarding: appoitment for numbness of left foot  ----- Message from [address] sent at 3/29/2023  4:49 PM EDT -----  \" I went to ER today for Numbness of left foot and they told me I had to make an appointment tomorrow with my doctor \"    "

## 2023-03-29 NOTE — DISCHARGE INSTRUCTIONS
There is no evidence of DVT in your left leg  I recommend following with your PCP if symptoms continue  If you develop new or worsening symptoms, please return to the Emergency Department for further evaluation

## 2023-03-29 NOTE — ED PROVIDER NOTES
"History  Chief Complaint   Patient presents with   • Medical Problem     Pt reports having L foot numbness since Monday- was seen by chiropractor and now just having pins and needles feeling since then  Now reporting chills and shakiness  Pt states she has \"occasional chest tightness\" with the last occurrence about 15 minutes ago  HPI     Patient is a 57 y/o F with PMH breast ca, HTN presenting with left foot numbness  Noticed on Monday, dorsum of foot was completely numb when she woke up  Went to [facility] same day and felt it was improving to pins/needles \"like it was waking up  \" Still persists with paresthesias  Associated chills and feeling shaky, states she thinks it's just nerves  States she messaged her PCP who recommend going to the ED to r/o DVT  Pt did take a bus ride to [place] and back last Wednesday  Denies leg swelling, known risk factors, CP, SOB  No other focal weakness or neurologic deficits  Prior to Admission Medications   Prescriptions Last Dose Informant Patient Reported? Taking?    Cholecalciferol 250 MCG (39093 UT) CAPS   Yes No   Sig: Take 1,000 Units by mouth daily   Multiple Vitamin (MULTIVITAMIN IRON-FREE PO)   Yes No   Sig: Take by mouth   Multiple Vitamins-Minerals (ZINC PO)   Yes No   aspirin 81 mg chewable tablet   Yes No   Sig: Chew 81 mg   calcium citrate-vitamin D (CITRACAL+D) 315-200 MG-UNIT per tablet   Yes No   Sig: Take by mouth daily    carvedilol (COREG CR) 20 MG 24 hr capsule   Yes No   Si mg every morning   irbesartan (AVAPRO) 150 mg tablet   Yes No   Sig: Take 150 mg by mouth daily   levothyroxine 50 mcg tablet   No No   Sig: Take 1 tablet (50 mcg total) by mouth daily in the early morning      Facility-Administered Medications: None       Past Medical History:   Diagnosis Date   • Breast cancer in female [facility])    • Heart murmur    • History of urethral narrowing    • Hypertension    • Kidney stones        Past Surgical History:   Procedure Laterality Date   • " BREAST BIOPSY Right    • BREAST LUMPECTOMY Right    • CO HYSTEROSCOPY BX ENDOMETRIUM&/POLYPC W/WO D&C N/A 11/9/2020    Procedure: DILATATION AND CURETTAGE (D&C) WITH HYSTEROSCOPY W/MYOSURE;  Surgeon: Alonso Ramirez DO;  Location: [facility];  Service: Gynecology   • CO HYSTEROSCOPY REMOVAL LEIOMYOMATA N/A 11/9/2020    Procedure: HYSTEROSCOPY WITH  RESECTION ENDOMETRIAL POLYP;  Surgeon: Alonso Ramirez DO;  Location: [facility];  Service: Gynecology   • CO LAPS TOTAL HYSTERECT 250 GM/< W/RMVL TUBE/OVARY N/A 2/11/2021    Procedure: ROBOTIC TOTAL LAPAROSCOPIC HYSTERECTOMY, BILATERAL SALPINGO-OOPHORECTOMY, LEFT PELVIC SENTINEL LYMPH NODE DISSECTION;  Surgeon: Christiano Dobson MD;  Location: [facility];  Service: Gynecology Oncology   • REMOVAL VENOUS PORT (PORT-A-CATH)         Family History   Problem Relation Age of Onset   • Skin cancer Mother    • Anemia Mother    • Leukemia Mother         MDS   • Thyroid disease Mother         hypothyroidism   • Heart failure Father    • Ovarian cancer Maternal Grandmother    • Stroke Maternal Grandfather 61   • Colon cancer Maternal Uncle 79     I have reviewed and agree with the history as documented  E-Cigarette/Vaping   • E-Cigarette Use Never User      E-Cigarette/Vaping Substances   • Nicotine No    • THC No    • CBD No    • Flavoring No    • Other No    • Unknown No      Social History     Tobacco Use   • Smoking status: Never   • Smokeless tobacco: Never   Vaping Use   • Vaping Use: Never used   Substance Use Topics   • Alcohol use: Not Currently   • Drug use: Not Currently        Review of Systems   Constitutional: Negative for chills and fever  HENT: Negative for ear pain and sore throat  Eyes: Negative for pain and visual disturbance  Respiratory: Negative for cough and shortness of breath  Cardiovascular: Negative for chest pain and palpitations  Gastrointestinal: Negative for abdominal pain and vomiting     Genitourinary: Negative for dysuria and hematuria  Musculoskeletal: Negative for arthralgias and back pain  Skin: Negative for color change and rash  Neurological: Positive for numbness  Negative for seizures and syncope  All other systems reviewed and are negative  Physical Exam  ED Triage Vitals [03/29/23 1304]   Temperature Pulse Respirations Blood Pressure SpO2   98 °F (36 7 °C) 98 18 168/85 99 %      Temp src Heart Rate Source Patient Position - Orthostatic VS BP Location FiO2 (%)   -- -- -- -- --      Pain Score       --             Orthostatic Vital Signs  Vitals:    03/29/23 1304   BP: 168/85   Pulse: 98       Physical Exam  Vitals and nursing note reviewed  Constitutional:       General: She is not in acute distress  Appearance: She is not ill-appearing  HENT:      Head: Normocephalic and atraumatic  Right Ear: External ear normal       Left Ear: External ear normal       Nose: Nose normal    Eyes:      Extraocular Movements: Extraocular movements intact  Pupils: Pupils are equal, round, and reactive to light  Cardiovascular:      Rate and Rhythm: Normal rate and regular rhythm  Pulses: Normal pulses  Heart sounds: Normal heart sounds  No murmur heard  No friction rub  No gallop  Pulmonary:      Effort: Pulmonary effort is normal  No respiratory distress  Breath sounds: Normal breath sounds  No wheezing or rales  Abdominal:      General: Abdomen is flat  There is no distension  Palpations: Abdomen is soft  Tenderness: There is no abdominal tenderness  There is no guarding or rebound  Musculoskeletal:         General: No tenderness or deformity  Normal range of motion  Cervical back: Normal range of motion  No rigidity  Right lower leg: No edema  Left lower leg: No edema  Skin:     General: Skin is warm and dry  Capillary Refill: Capillary refill takes less than 2 seconds  Findings: No rash  Neurological:      General: No focal deficit present  Mental Status: She is alert  Cranial Nerves: No cranial nerve deficit  Sensory: No sensory deficit  Motor: No weakness  Gait: Gait normal       Comments: Normal proprioception and sensation to light touch and pin prick in left foot, normal babinski, no clonus, normal reflexes  No focal findings  NVI LLE   Psychiatric:         Mood and Affect: Mood normal          ED Medications  Medications - No data to display    Diagnostic Studies  Results Reviewed     Procedure Component Value Units Date/Time    D-dimer, quantitative [[id number]]  (Abnormal) Collected: 03/29/23 1428    Lab Status: Final result Specimen: Blood from Arm, Left Updated: 03/29/23 1505     D-Dimer, Quant 1 27 ug/ml FEU     Narrative: In the evaluation for possible pulmonary embolism, in the appropriate (Well's Score of 4 or less) patient, the age adjusted d-dimer cutoff for this patient can be calculated as:    Age x 0 01 (in ug/mL) for Age-adjusted D-dimer exclusion threshold for a patient over 50 years  VAS lower limb venous duplex study, unilateral/limited   ED Interpretation by Ilan Villeda MD (03/29 1545)   Neg per tech  Final Result by Silvia Hunter DO (03/29 2127)            Procedures  Procedures      ED Course  ED Course as of 03/29/23 2201   Wed Mar 29, 2023   1500 ECG 12 lead  Procedure Note: EKG  Date/Time: 03/29/23 3:00 PM   Interpreted by: Brent Bryson MD  Indications / Diagnosis: tachycardia  ECG reviewed by me, the ED Provider: yes   The EKG demonstrates:  Rhythm: normal sinus  Intervals: normal intervals  Axis: normal axis  QRS/Blocks: normal QRS  ST Changes: No acute ST Changes, no STD/HILDA      1543 VAS lower limb venous duplex study, unilateral/limited  TT for vascular, no evidence of DVT                                       Medical Decision Making  57 y/o F presenting with isolate dorsal left foot numbness  Vitals stable   No focal findings on exam  Pt with recent long trip and personal concern for DVT, as she is low risk will first assess d dimer and if elevated will pursue duplex  Dimer is elevated, duplex ordered  Negative for DVT  Unclear etiology of numbness in foot  Low suspicion for serious pathology at this time, recommend close PCP f/u and return to ED for new neurologic findings or worsened symptoms  Pt understanding and agreeable  Amount and/or Complexity of Data Reviewed  Labs: ordered  Radiology:  Decision-making details documented in ED Course  Disposition  Final diagnoses:   Numbness of left foot     Time reflects when diagnosis was documented in both MDM as applicable and the Disposition within this note     Time User Action Codes Description Comment    3/29/2023  3:44 PM Jennifer Burn Add [R20 0] Numbness of left foot       ED Disposition     ED Disposition   Discharge    Condition   Stable    Date/Time   Wed Mar 29, 2023  3:44 PM    Comment   Lico Carpenter discharge to home/self care                 Follow-up Information     Follow up With Specialties Details Why [address] Deshawn Mcfadden MD Family Medicine   [address]  [address] [facility] [address] [phone]            Discharge Medication List as of 3/29/2023  3:45 PM      CONTINUE these medications which have NOT CHANGED    Details   aspirin 81 mg chewable tablet Chew 81 mg, Historical Med      calcium citrate-vitamin D (CITRACAL+D) 315-200 MG-UNIT per tablet Take by mouth daily , Historical Med      carvedilol (COREG CR) 20 MG 24 hr capsule 20 mg every morning, Starting Fri 7/10/2020, Historical Med      Cholecalciferol 250 MCG (52749 UT) CAPS Take 1,000 Units by mouth daily, Historical Med      irbesartan (AVAPRO) 150 mg tablet Take 150 mg by mouth daily, Starting Wed 10/5/2022, Historical Med      levothyroxine 50 mcg tablet Take 1 tablet (50 mcg total) by mouth daily in the early morning, Starting Fri 8/13/2021, Until Fri 2/10/2023, Normal      Multiple Vitamin (MULTIVITAMIN IRON-FREE PO) Take by mouth, Historical Med      Multiple Vitamins-Minerals (ZINC PO) Starting Fri 7/1/2022, Historical Med           No discharge procedures on file  PDMP Review       Value Time User    PDMP Reviewed  Yes 2/11/2021 12:49 PM Mukesh Randle MD           ED Provider  Attending physically available and evaluated Maverick Michael PRESTON managed the patient along with the ED Attending      Electronically Signed by         Suzanne Rondon MD  03/29/23 9628

## 2023-03-30 ENCOUNTER — LAB (OUTPATIENT)
Dept: LAB | Age: 63
End: 2023-03-30

## 2023-03-30 ENCOUNTER — OFFICE VISIT (OUTPATIENT)
Dept: FAMILY MEDICINE CLINIC | Facility: CLINIC | Age: 63
End: 2023-03-30

## 2023-03-30 VITALS
RESPIRATION RATE: 16 BRPM | WEIGHT: 253.8 LBS | SYSTOLIC BLOOD PRESSURE: 126 MMHG | OXYGEN SATURATION: 97 % | HEART RATE: 78 BPM | DIASTOLIC BLOOD PRESSURE: 84 MMHG | BODY MASS INDEX: 39.75 KG/M2 | TEMPERATURE: 98.3 F

## 2023-03-30 DIAGNOSIS — R20.2 PARESTHESIA OF LEFT FOOT: Primary | ICD-10-CM

## 2023-03-30 DIAGNOSIS — I10 HYPERTENSION, UNSPECIFIED TYPE: ICD-10-CM

## 2023-03-30 DIAGNOSIS — E78.5 HYPERLIPIDEMIA, UNSPECIFIED HYPERLIPIDEMIA TYPE: ICD-10-CM

## 2023-03-30 DIAGNOSIS — E03.9 HYPOTHYROIDISM, UNSPECIFIED TYPE: ICD-10-CM

## 2023-03-30 LAB
ALBUMIN SERPL BCP-MCNC: 3.6 G/DL (ref 3.5–5)
ALP SERPL-CCNC: 97 U/L (ref 46–116)
ALT SERPL W P-5'-P-CCNC: 20 U/L (ref 12–78)
ANION GAP SERPL CALCULATED.3IONS-SCNC: 3 MMOL/L (ref 4–13)
AST SERPL W P-5'-P-CCNC: 11 U/L (ref 5–45)
BILIRUB SERPL-MCNC: 0.64 MG/DL (ref 0.2–1)
BUN SERPL-MCNC: 17 MG/DL (ref 5–25)
CALCIUM SERPL-MCNC: 9.3 MG/DL (ref 8.3–10.1)
CHLORIDE SERPL-SCNC: 103 MMOL/L (ref 96–108)
CHOLEST SERPL-MCNC: 195 MG/DL
CO2 SERPL-SCNC: 28 MMOL/L (ref 21–32)
CREAT SERPL-MCNC: 1.07 MG/DL (ref 0.6–1.3)
CREAT UR-MCNC: 128 MG/DL
GFR SERPL CREATININE-BSD FRML MDRD: 55 ML/MIN/1.73SQ M
GLUCOSE P FAST SERPL-MCNC: 104 MG/DL (ref 65–99)
HDLC SERPL-MCNC: 37 MG/DL
LDLC SERPL CALC-MCNC: 133 MG/DL (ref 0–100)
MICROALBUMIN UR-MCNC: <5 MG/L (ref 0–20)
MICROALBUMIN/CREAT 24H UR: <4 MG/G CREATININE (ref 0–30)
NONHDLC SERPL-MCNC: 158 MG/DL
POTASSIUM SERPL-SCNC: 3.9 MMOL/L (ref 3.5–5.3)
PROT SERPL-MCNC: 7.9 G/DL (ref 6.4–8.4)
SODIUM SERPL-SCNC: 134 MMOL/L (ref 135–147)
TRIGL SERPL-MCNC: 125 MG/DL
TSH SERPL DL<=0.05 MIU/L-ACNC: 2.87 UIU/ML (ref 0.45–4.5)

## 2023-03-31 NOTE — PROGRESS NOTES
Name: Dominga Luis      : 1960      MRN: [id number]  Encounter Provider: Zaki Pearson MD  Encounter Date: 3/30/2023   Encounter department: [place] [address]  Paresthesia of left foot  Comments:  Seen in the ED for left foot paresthesia  Patient was concerned for DVT  D-dimer was elevated but duplex ultrasound was negative  Paresthesia likely from nerve compression or sciatica  No weakness noted therefore peroneal neuropathy is less likely  Patient went to see her chiropractor this afternoon  Call precautions reviewed  2  Hypertension, unspecified type  Assessment & Plan:  At goal on carvedilol 20 mg daily and Avapro 150 mg daily           Subjective      Patient is here for ER follow-up  Patient noted numbness in the left foot  After seeing her chiropractor the numbness resolved and develops paresthesia  Patient was concerned for DVT and went to the ER for evaluation  D-dimer was elevated but duplex  ultrasound ruled out DVT  Patient currently denies leg swelling, redness, pain, or weakness in the left leg  She does have mild pain in the lower back for which she plans to see her chiropractor this afternoon       Review of Systems   Per HPI    Current Outpatient Medications on File Prior to Visit   Medication Sig   • aspirin 81 mg chewable tablet Chew 81 mg   • calcium citrate-vitamin D (CITRACAL+D) 315-200 MG-UNIT per tablet Take by mouth daily    • carvedilol (COREG CR) 20 MG 24 hr capsule 20 mg every morning   • Cholecalciferol 250 MCG (67933 UT) CAPS Take 1,000 Units by mouth daily   • irbesartan (AVAPRO) 150 mg tablet Take 150 mg by mouth daily   • Multiple Vitamin (MULTIVITAMIN IRON-FREE PO) Take by mouth   • Multiple Vitamins-Minerals (ZINC PO)    • levothyroxine 50 mcg tablet Take 1 tablet (50 mcg total) by mouth daily in the early morning       Objective     /84   Pulse 78   Temp 98 3 °F (36 8 °C)   Resp 16   Wt 115 kg (253 lb 12 8 oz)   SpO2 97%   BMI 39 75 kg/m²     Physical Exam  Constitutional:       General: She is not in acute distress  Appearance: Normal appearance  She is not ill-appearing or toxic-appearing  HENT:      Head: Normocephalic and atraumatic  Eyes:      Extraocular Movements: Extraocular movements intact  Cardiovascular:      Heart sounds: No murmur heard  Pulmonary:      Effort: Pulmonary effort is normal    Musculoskeletal:      Left lower leg: No swelling, deformity or tenderness  No edema  Left foot: Normal range of motion  Swelling (dorsum of the foot) present  No deformity  Normal pulse  Comments: Decreased sensation on the dorsum of the left foot  Negative Homans  No calf tenderness   Skin:     General: Skin is warm  Findings: No rash  Neurological:      Mental Status: She is alert  Psychiatric:         Mood and Affect: Mood normal          Behavior: Behavior normal          Thought Content:  Thought content normal        Amita Aleman MD

## 2023-04-03 ENCOUNTER — OFFICE VISIT (OUTPATIENT)
Dept: GYNECOLOGIC ONCOLOGY | Facility: CLINIC | Age: 63
End: 2023-04-03

## 2023-04-03 VITALS
TEMPERATURE: 97.4 F | OXYGEN SATURATION: 95 % | WEIGHT: 254 LBS | HEIGHT: 67 IN | SYSTOLIC BLOOD PRESSURE: 160 MMHG | DIASTOLIC BLOOD PRESSURE: 82 MMHG | BODY MASS INDEX: 39.87 KG/M2 | HEART RATE: 121 BPM

## 2023-04-03 DIAGNOSIS — Z08 ENCOUNTER FOR FOLLOW-UP SURVEILLANCE OF ENDOMETRIAL CANCER: ICD-10-CM

## 2023-04-03 DIAGNOSIS — Z85.42 ENCOUNTER FOR FOLLOW-UP SURVEILLANCE OF ENDOMETRIAL CANCER: ICD-10-CM

## 2023-04-03 DIAGNOSIS — Z85.42 HISTORY OF ENDOMETRIAL CANCER: Primary | ICD-10-CM

## 2023-04-05 PROBLEM — Z85.42 HISTORY OF ENDOMETRIAL CANCER: Status: ACTIVE | Noted: 2020-11-09

## 2023-04-05 NOTE — PROGRESS NOTES
"Assessment/Plan:    Problem List Items Addressed This Visit        Other    History of endometrial cancer - Primary     History of stage IA, grade 1 endometrial cancer s/p surgical resection in February 2021  She also has a personal history of breast cancer  She has a resolving vulvar abscess  She is clinically without evidence of endometrial cancer recurrence  Return to the office in 1 year for continued cancer surveillance  Encounter for follow-up surveillance of endometrial cancer         CHIEF COMPLAINT:   Painful vaginal lump x several days  Endometrial cancer surveillance    Problem:  Cancer Staging   Endometrial cancer ([place] [address] )  Staging form: Corpus Uteri - Carcinoma, AJCC 8th Edition  - Clinical: FIGO Stage IA - Signed by Mata Hernandez MD on 6/14/2021        Previous therapy:  Oncology History   Endometrial cancer ([place] [address] )   11/9/2020 Initial Diagnosis    Endometrial cancer ([place] [address] )     2/11/2021 Surgery    RA-TLH/BSO/SLND     6/14/2021 -  Cancer Staged    Staging form: Corpus Uteri - Carcinoma, AJCC 8th Edition  - Clinical: FIGO Stage IA - Signed by Mata Hernandez MD on 6/14/2021  Histologic grade (G): G1  Histologic grading system: 3 grade system  Lymph-vascular invasion (LVI): LVI present/identified, NOS             Patient ID: Demarcus Kahn is a 58 y o  female  who presents to the office for acute evaluation of painful vaginal lump and endometrial cancer surveillance  The patient notes several days ago she noticed a \"painful boil\" on her vagina  After a few days she noted blood and drainage  The patient has been afebrile  Her pain has significantly improved and she denies continued drainage or bleeding  She notes normal bowel/bladder function  Appetite is appropriate  No n/v  Otherwise, she has been well in the interim without significant changes in her past medical history         The following portions of the patient's history were reviewed and updated as appropriate: allergies, current " "medications, past medical history, past surgical history and problem list     Review of Systems   Constitutional: Negative  HENT: Negative  Eyes: Negative  Respiratory: Negative  Cardiovascular: Negative  Gastrointestinal: Negative  Genitourinary:        As per HPI  Musculoskeletal: Negative  Skin: Negative  Neurological: Negative  Psychiatric/Behavioral: Negative  Current Outpatient Medications   Medication Sig Dispense Refill   • aspirin 81 mg chewable tablet Chew 81 mg     • calcium citrate-vitamin D (CITRACAL+D) 315-200 MG-UNIT per tablet Take by mouth daily      • carvedilol (COREG CR) 20 MG 24 hr capsule 20 mg every morning     • Cholecalciferol 250 MCG (08819 UT) CAPS Take 1,000 Units by mouth daily     • irbesartan (AVAPRO) 150 mg tablet Take 150 mg by mouth daily     • Multiple Vitamin (MULTIVITAMIN IRON-FREE PO) Take by mouth     • Multiple Vitamins-Minerals (ZINC PO)      • levothyroxine 50 mcg tablet Take 1 tablet (50 mcg total) by mouth daily in the early morning 90 tablet 1     No current facility-administered medications for this visit  Objective:    Blood pressure 160/82, pulse (!) 121, temperature (!) 97 4 °F (36 3 °C), temperature source Temporal, height 5' 7\" (1 702 m), weight 115 kg (254 lb), SpO2 95 %  Body mass index is 39 78 kg/m²  Body surface area is 2 24 meters squared  Physical Exam  Vitals reviewed  Exam conducted with a chaperone present  Constitutional:       General: She is not in acute distress  Appearance: Normal appearance  She is not ill-appearing  HENT:      Head: Normocephalic and atraumatic  Mouth/Throat:      Mouth: Mucous membranes are moist    Eyes:      General:         Right eye: No discharge  Left eye: No discharge  Conjunctiva/sclera: Conjunctivae normal    Pulmonary:      Effort: Pulmonary effort is normal    Abdominal:      Palpations: Abdomen is soft  There is no mass  Tenderness:  There " is no abdominal tenderness  Hernia: No hernia is present  Genitourinary:     Comments: The external female genitalia is normal  Right labia minora, approximately 9 o'clock, resolving abscess  Minimal induration noted  The bartholin's, uretheral and skenes glands are normal  The urethral meatus is normal (midline with no lesions)  Anus without fissure or lesion  Speculum exam reveals a grossly normal vagina  No masses, lesions,discharge or bleeding  No significant cystocele or rectocele noted  Bimanual exam notes a surgical absent cervix, uterus and adnexal structures  No masses or fullness  Bladder is without fullness, mass or tenderness  Musculoskeletal:      Right lower leg: No edema  Left lower leg: No edema  Skin:     General: Skin is warm and dry  Coloration: Skin is not jaundiced  Findings: No rash  Neurological:      General: No focal deficit present  Mental Status: She is alert and oriented to person, place, and time  Cranial Nerves: No cranial nerve deficit  Sensory: No sensory deficit  Motor: No weakness  Gait: Gait normal    Psychiatric:         Mood and Affect: Mood normal          Behavior: Behavior normal          Thought Content:  Thought content normal          Judgment: Judgment normal

## 2023-04-05 NOTE — ASSESSMENT & PLAN NOTE
History of stage IA, grade 1 endometrial cancer s/p surgical resection in February 2021  She also has a personal history of breast cancer  She has a resolving vulvar abscess  She is clinically without evidence of endometrial cancer recurrence  Return to the office in 1 year for continued cancer surveillance

## 2024-04-08 ENCOUNTER — OFFICE VISIT (OUTPATIENT)
Dept: GYNECOLOGIC ONCOLOGY | Facility: CLINIC | Age: 64
End: 2024-04-08
Payer: COMMERCIAL

## 2024-04-08 VITALS
RESPIRATION RATE: 16 BRPM | OXYGEN SATURATION: 96 % | HEIGHT: 67 IN | SYSTOLIC BLOOD PRESSURE: 158 MMHG | HEART RATE: 83 BPM | BODY MASS INDEX: 41.99 KG/M2 | WEIGHT: 267.5 LBS | DIASTOLIC BLOOD PRESSURE: 94 MMHG | TEMPERATURE: 98.2 F

## 2024-04-08 DIAGNOSIS — Z08 ENCOUNTER FOR FOLLOW-UP SURVEILLANCE OF ENDOMETRIAL CANCER: Primary | ICD-10-CM

## 2024-04-08 DIAGNOSIS — Z85.42 HISTORY OF ENDOMETRIAL CANCER: ICD-10-CM

## 2024-04-08 DIAGNOSIS — Z85.42 ENCOUNTER FOR FOLLOW-UP SURVEILLANCE OF ENDOMETRIAL CANCER: Primary | ICD-10-CM

## 2024-04-08 PROCEDURE — 99212 OFFICE O/P EST SF 10 MIN: CPT | Performed by: PHYSICIAN ASSISTANT

## 2024-04-08 RX ORDER — CARVEDILOL 12.5 MG/1
TABLET ORAL
COMMUNITY
Start: 2024-02-01

## 2024-04-08 NOTE — ASSESSMENT & PLAN NOTE
History of stage IA, grade 1 endometrial cancer s/p surgical resection in February 2021. She is clinically without evidence of endometrial cancer recurrence.      Return to the office in 1 year for continued cancer surveillance.

## 2024-04-08 NOTE — PROGRESS NOTES
Assessment/Plan:    Problem List Items Addressed This Visit          Oncology    History of endometrial cancer     History of stage IA, grade 1 endometrial cancer s/p surgical resection in February 2021. She is clinically without evidence of endometrial cancer recurrence.      Return to the office in 1 year for continued cancer surveillance.             Encounter for follow-up surveillance of endometrial cancer - Primary         CHIEF COMPLAINT:   Endometrial cancer surveillance    Problem:  Cancer Staging   History of endometrial cancer  Staging form: Corpus Uteri - Carcinoma, AJCC 8th Edition  - Clinical: FIGO Stage IA - Signed by Keesha Smith MD on 6/14/2021        Previous therapy:  Oncology History   History of endometrial cancer   11/9/2020 Initial Diagnosis    Endometrial cancer ([facility])     2/11/2021 Surgery    RA-TLH/BSO/SLND     6/14/2021 -  Cancer Staged    Staging form: Corpus Uteri - Carcinoma, AJCC 8th Edition  - Clinical: FIGO Stage IA - Signed by Keesha Smith MD on 6/14/2021  Histologic grade (G): G1  Histologic grading system: 3 grade system  Lymph-vascular invasion (LVI): LVI present/identified, NOS             Patient ID: Moni Moss is a 63 y.o. female  who has no new complaints today. No vaginal bleeding, abdominal/pelvic pain. Normal bowel and bladder function. No interval change in medical history since last visit. Quality of life is good.        The following portions of the patient's history were reviewed and updated as appropriate: allergies, current medications, past medical history, past surgical history, and problem list.    Review of Systems   Constitutional: Negative.    HENT: Negative.     Eyes: Negative.    Respiratory: Negative.     Cardiovascular: Negative.    Gastrointestinal: Negative.    Genitourinary: Negative.    Musculoskeletal: Negative.    Skin: Negative.    Neurological: Negative.    Psychiatric/Behavioral: Negative.         Current Outpatient Medications   Medication  "Sig Dispense Refill    aspirin 81 mg chewable tablet Chew 81 mg      calcium citrate-vitamin D (CITRACAL+D) 315-200 MG-UNIT per tablet Take by mouth daily       carvedilol (COREG) 12.5 mg tablet TAKE 1 TABLET (12.5 MG TOTAL) BY MOUTH 2 (TWO) TIMES A DAY WITH MEALS.      Cholecalciferol 250 MCG (54328 UT) CAPS Take 1,000 Units by mouth daily      irbesartan (AVAPRO) 150 mg tablet Take 150 mg by mouth daily      levothyroxine 50 mcg tablet Take 1 tablet (50 mcg total) by mouth daily TAKE 1 TABLET EVERY DAY  !!ALVOGEN BRAND!! 30 tablet 1    Multiple Vitamin (MULTIVITAMIN IRON-FREE PO) Take by mouth      Multiple Vitamins-Minerals (ZINC PO)       carvedilol (COREG CR) 20 MG 24 hr capsule 20 mg every morning (Patient not taking: Reported on 4/8/2024)       No current facility-administered medications for this visit.           Objective:    Blood pressure 158/94, pulse 83, temperature 98.2 °F (36.8 °C), temperature source Temporal, resp. rate 16, height 5' 7\" (1.702 m), weight 121 kg (267 lb 8 oz), SpO2 96%.  Body mass index is 41.9 kg/m².  Body surface area is 2.29 meters squared.    Physical Exam  Vitals reviewed. Exam conducted with a chaperone present.   Constitutional:       General: She is not in acute distress.     Appearance: Normal appearance. She is not ill-appearing.   HENT:      Head: Normocephalic and atraumatic.      Mouth/Throat:      Mouth: Mucous membranes are moist.   Eyes:      General:         Right eye: No discharge.         Left eye: No discharge.      Conjunctiva/sclera: Conjunctivae normal.   Pulmonary:      Effort: Pulmonary effort is normal.   Abdominal:      Palpations: Abdomen is soft. There is no mass.      Tenderness: There is no abdominal tenderness.      Hernia: No hernia is present.   Genitourinary:     Comments: The external female genitalia is normal. The bartholin's, uretheral and skenes glands are normal. The urethral meatus is normal (midline with no lesions). Anus without fissure or " lesion. Speculum exam reveals a grossly normal vagina. No masses, lesions,discharge or bleeding. No significant cystocele or rectocele noted. Bimanual exam notes a surgical absent cervix, uterus and adnexal structures. No masses or fullness. Bladder is without fullness, mass or tenderness.  Musculoskeletal:      Right lower leg: No edema.      Left lower leg: No edema.   Skin:     General: Skin is warm and dry.      Coloration: Skin is not jaundiced.      Findings: No rash.   Neurological:      General: No focal deficit present.      Mental Status: She is alert and oriented to person, place, and time.      Cranial Nerves: No cranial nerve deficit.      Sensory: No sensory deficit.      Motor: No weakness.      Gait: Gait normal.   Psychiatric:         Mood and Affect: Mood normal.         Behavior: Behavior normal.         Thought Content: Thought content normal.         Judgment: Judgment normal.

## 2024-06-21 ENCOUNTER — TELEPHONE (OUTPATIENT)
Dept: CARDIOLOGY CLINIC | Facility: CLINIC | Age: 64
End: 2024-06-21

## 2024-06-21 NOTE — TELEPHONE ENCOUNTER
LMOM to help patient establish care with Dr. Dominguez (cardio-oncology). Anyone may assist in scheduling a new patient appointment.

## 2024-06-25 ENCOUNTER — TELEPHONE (OUTPATIENT)
Dept: CARDIOLOGY CLINIC | Facility: CLINIC | Age: 64
End: 2024-06-25

## 2024-06-25 NOTE — TELEPHONE ENCOUNTER
LMOM to help patient establish care with Dr. Dominguez (cardio-oncology). Anyone may assist in scheduling a new patient appointment. 2nd attempt

## 2024-07-02 NOTE — TELEPHONE ENCOUNTER
3rd attempt- LMOM for patient to callback to establish care with Dr. Dominguez (cardio-onc) anyone may assist in scheduling through the referral.

## 2024-12-23 ENCOUNTER — TELEPHONE (OUTPATIENT)
Dept: CARDIOLOGY CLINIC | Facility: CLINIC | Age: 64
End: 2024-12-23

## 2024-12-23 NOTE — TELEPHONE ENCOUNTER
LMOM to offer patient a sooner cardio oncology appointment with Dr. Dominguez (12/27/24 @ 1PM, Cardio Onc [place] [facility]).     If rescheduling please remind patient this is a different location than the original appointment

## 2025-01-14 NOTE — PROGRESS NOTES
Cardio-Oncology / Heart Failure Cardiology Clinic Note    Moni Moss 64 y.o. female   MRN: [id number]  Encounter: [id number]        Assessment / Plan:    # Cardio-Oncology Pertinent History  Endometrial cancer  Dx 2021  S/p - surgery  No chemotherapy  Breast cancer  Dx 2005.  Right sided.   S/p - chemo including Adriamycin   S/p - XRT    # Cardio-Oncology Survivorship Recommendations  Discussed that many cancer survivors are at increased risk of cardiovascular disease due to cancer treatments as well as comorbidities.  Discussed importance of optimizing cardiovascular risk factors and post-treatment cardiac surveillance when appropriate.    Adriamycin     Annual history and physical  BP management - SEE BELOW  Lipids - SEE BELOW  Diabetes screening with PCP  Exercise  - does swimming, walking.  Rec 30min, 5 days per week.  Healthy diet - discussed Mediterranean diet.  Routine dental care -  doing this.   Post-treatment surveillance:  complete    # HTN  Discussed importance of BP control in setting of prior anthracyclines.  Coreg 12.5 BID  Irbesartan 150 QD  BP today - at goal    # HLD  Last .   Has mild coronary calcium and carotid atherosclerosis.  Therefore, recommend statin.    # Coronary calcification  2023 - score 10  On ASA 81  start statin    # Possible inferior Q waves on EKG  Seen today.  Similar to prior EKG in 2023  Echo shows normal EF, no WMA    # Chronic atypical chest pain  Longstanding for years.  Atyipcal and not consistent with cardiac chest pain.   Normal stress test 2021    # carotid atherosclerosis   [facility] screening 2023 showed bilateral carotid moderate atherosclerosis   On ASA 81  start statin    # Dilated ascending aorta  Per echo mildly dilated  Serial follow-up -  likely order echo next visit    # obesity  BMI 41  Weight loss recommended      Today's Plan Summary:  See above assessment/plan for full details of today's plan.  Briefly,     Start crestor                Reason  For Visit / Chief Complaint:  New - former patient of Dr Saha    HPI:   Moni Moss is a 64 y.o.  female with history as noted in the problem list and further detailed in the above assessment and plan.    New - former patient of Dr Saha    As above, the patient has a history of breast cancer and endometrial cancer.  Also has a history of hypertension, hyperlipidemia, mild coronary calcification.    Today, the patient reports  -  has some fatigue.  Has chronic chest pain.  Comes on at rest, lasts for seconds and then goes away.  Never exertional chest pain.   No SOB or BRO.   No orthopnea or PND.   No leg edema.   Rare palpitations.   No syncope.      Retired.  Was a  and .     Never smoker.  No ETOH.   No drugs.        Cardiac Imaging personally reviewed:  EKG 1-15-25  Sinus rhythm  Borderline LVH  Possible inferior Q waves       Holter or event monitor Holter April 2023:   Benign.    Echo June 14, 2024:  EF 60%. There is normal diastolic function.  Mild TR  Ascending Aorta: The ascending aorta is mildly dilated.       BIGG    Cardiac MRI    Stress testing Pharmacological MPI May 12, 2021: (done for )  The ECG and SPECT imaging portions of the stress study are concordant with no evidence of inducible myocardial ischemia or infarction.       [facility]    CP[facility]              Patient Active Problem List    Diagnosis Date Noted   • Mixed hyperlipidemia 01/15/2025   • Coronary artery calcification 01/15/2025   • Ascending aorta dilatation (HCC) 01/15/2025   • Lactose intolerance 02/07/2023   • History of COVID-19 09/14/2022   • Anxiety 02/10/2022   • Encounter for follow-up surveillance of endometrial cancer 06/14/2021   • Vertigo 06/09/2021   • Gastroesophageal reflux disease with esophagitis without hemorrhage 06/09/2021   • Migraine without status migrainosus, not intractable 06/09/2021   • Morbid obesity with BMI of 40.0-44.9, adult (HCC) 06/09/2021   • Obesity (BMI  30-39.9) 02/10/2021   • HTN (hypertension) 11/24/2020   • Breast cancer (HCC) 11/24/2020   • History of endometrial cancer 11/09/2020   • Diverticular disease of colon 10/21/2016   • Hemorrhoids 10/21/2016       Past Medical History:   Diagnosis Date   • Breast cancer in female (HCC)    • Cancer (HCC) 2005 and 2021   • Clotting disorder (HCC) 2020   • Disease of thyroid gland 4/2021    hypothyroidism   • Heart murmur    • History of urethral narrowing    • Hypertension    • Kidney stones    • Mitral valve prolapse 1978       Review of Systems   Constitutional:  Negative for chills and fever.   HENT:  Negative for nosebleeds.    Gastrointestinal:  Negative for abdominal distention, abdominal pain and blood in stool.   Neurological:  Positive for dizziness (vertigo when moving head to left). Negative for syncope.   Psychiatric/Behavioral:  Negative for confusion.    14-point ROS completed and negative except as stated above and/or in the HPI.    Allergies   Allergen Reactions   • Benadryl [Diphenhydramine] Other (See Comments)     Jitters   • Latex Rash and Blisters     Latex Exam Gloves   • Wound Dressing Adhesive Blisters       Current Outpatient Medications   Medication Instructions   • aspirin 81 mg, Daily   • calcium citrate-vitamin D (CITRACAL+D) 315-200 MG-UNIT per tablet 1 tablet, Daily   • carvedilol (COREG) 12.5 mg, 2 times daily with meals   • Cholecalciferol 2,000 Units, Daily   • irbesartan (AVAPRO) 150 mg, Daily   • levothyroxine 50 mcg, Oral, Daily, TAKE 1 TABLET EVERY DAY  !!ALVOGEN BRAND!!   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    Tobacco Use   • Smoking status: Never   • Smokeless tobacco: Never   Vaping Use   • Vaping status: Never Used   Substance and Sexual Activity   • Alcohol use: Not Currently   • Drug use: Never   • Sexual activity: Not Currently     Partners: Male     Birth control/protection: None   Other Topics Concern   • Not on file   Social History Narrative   • Not on file     Social Drivers of Health     Financial Resource Strain: Low Risk  (6/9/2021)    Overall Financial Resource Strain (CARDIA)    • Difficulty of Paying Living Expenses: Not hard at all   Food Insecurity: No Food Insecurity (6/9/2021)    Hunger Vital Sign    • Worried About Running Out of Food in the Last Year: Never true    • Ran Out of Food in the Last Year: Never true   Transportation Needs: No Transportation Needs (6/9/2021)    PRAPARE - Transportation    • Lack of Transportation (Medical): No    • Lack of Transportation (Non-Medical): No   Physical Activity: Insufficiently Active (6/9/2021)    Exercise Vital Sign    • Days of Exercise per Week: 4 days    • Minutes of Exercise per Session: 20 min   Stress: No Stress Concern Present (6/9/2021)    [employer] [place] [employer] - Occupational Stress Questionnaire    • Feeling of Stress : Only a little   Social Connections: Moderately Integrated (6/9/2021)    Social Connection and Isolation Panel [NHANES]    • Frequency of Communication with Friends and Family: More than three times a week    • Frequency of Social Gatherings with Friends and Family: More than three times a week    • Attends [employer] Services: More than 4 times per year    • Active Member of Clubs or Organizations: Yes    • Attends Club or Organization Meetings: More than 4 times per year    • Marital Status: Never    Intimate Partner Violence: Not At Risk  "(6/9/2021)    Humiliation, Afraid, Rape, and Kick questionnaire    • Fear of Current or Ex-Partner: No    • Emotionally Abused: No    • Physically Abused: No    • Sexually Abused: No   Housing Stability: Not on file       Family History   Problem Relation Age of Onset   • Skin cancer Mother    • Anemia Mother         cold aglutinin disease   • Leukemia Mother         MDS   • Thyroid disease Mother         hypothyroidism   • Hypertension Mother    • Atrial fibrillation Mother    • Heart failure Father         started in his late 70s   • Ovarian cancer Maternal Grandmother    • Stroke Maternal Grandfather 60   • Colon cancer Maternal Uncle 70       Physical Exam:  Blood pressure 118/76, pulse 76, height 5' 7\" (1.702 m), weight 119 kg (262 lb), SpO2 99%.  Body mass index is 41.04 kg/m².  Wt Readings from Last 3 Encounters:   01/15/25 119 kg (262 lb)   04/08/24 121 kg (267 lb 8 oz)   04/03/23 115 kg (254 lb)     Physical Exam  Vitals reviewed.   Constitutional:       General: She is not in acute distress.     Appearance: She is not toxic-appearing.   HENT:      Head: Normocephalic and atraumatic.   Eyes:      General: No scleral icterus.     Conjunctiva/sclera: Conjunctivae normal.   Neck:      Vascular: No carotid bruit.      Comments: No JVP elevation  Cardiovascular:      Rate and Rhythm: Normal rate and regular rhythm.      Heart sounds: No murmur heard.     No friction rub. No gallop.   Pulmonary:      Breath sounds: Normal breath sounds. No wheezing, rhonchi or rales.   Abdominal:      General: There is no distension.      Palpations: Abdomen is soft.      Tenderness: There is no abdominal tenderness. There is no guarding.   Musculoskeletal:      Right lower leg: No edema.      Left lower leg: No edema.   Skin:     Coloration: Skin is not jaundiced or pale.      Findings: No erythema.   Neurological:      Mental Status: She is alert. Mental status is at baseline.   Psychiatric:         Mood and Affect: Mood " "normal.         Behavior: Behavior normal.         Labs & Results:  Lab Results   Component Value Date    SODIUM 140 06/15/2024    K 4.2 06/15/2024     06/15/2024    CO2 26 06/15/2024    BUN 17 06/15/2024    CREATININE 1.07 06/15/2024    GLUC 96 06/15/2024    CALCIUM 8.7 06/15/2024     No results found for: \"NTBNP\"       Thank you for the opportunity to participate in the care of this patient.    Miller Dominguez MD, [facility]  Staff Cardiologist  Director of Cardio-Oncology  [facility]  "

## 2025-01-15 ENCOUNTER — OFFICE VISIT (OUTPATIENT)
Age: 65
End: 2025-01-15
Payer: COMMERCIAL

## 2025-01-15 VITALS
WEIGHT: 262 LBS | DIASTOLIC BLOOD PRESSURE: 76 MMHG | HEIGHT: 67 IN | OXYGEN SATURATION: 99 % | SYSTOLIC BLOOD PRESSURE: 118 MMHG | BODY MASS INDEX: 41.12 KG/M2 | HEART RATE: 76 BPM

## 2025-01-15 DIAGNOSIS — E78.2 MIXED HYPERLIPIDEMIA: ICD-10-CM

## 2025-01-15 DIAGNOSIS — E66.01 CLASS 3 SEVERE OBESITY WITHOUT SERIOUS COMORBIDITY WITH BODY MASS INDEX (BMI) OF 40.0 TO 44.9 IN ADULT, UNSPECIFIED OBESITY TYPE (HCC): ICD-10-CM

## 2025-01-15 DIAGNOSIS — I25.10 CORONARY ARTERY CALCIFICATION: ICD-10-CM

## 2025-01-15 DIAGNOSIS — I10 PRIMARY HYPERTENSION: ICD-10-CM

## 2025-01-15 DIAGNOSIS — I10 ESSENTIAL HYPERTENSION: Primary | ICD-10-CM

## 2025-01-15 DIAGNOSIS — R94.31 ABNORMAL EKG: ICD-10-CM

## 2025-01-15 DIAGNOSIS — I77.810 ASCENDING AORTA DILATATION (HCC): ICD-10-CM

## 2025-01-15 DIAGNOSIS — E66.813 CLASS 3 SEVERE OBESITY WITHOUT SERIOUS COMORBIDITY WITH BODY MASS INDEX (BMI) OF 40.0 TO 44.9 IN ADULT, UNSPECIFIED OBESITY TYPE (HCC): ICD-10-CM

## 2025-01-15 DIAGNOSIS — R07.9 CHEST PAIN, UNSPECIFIED TYPE: ICD-10-CM

## 2025-01-15 DIAGNOSIS — Z92.21 STATUS POST ADMINISTRATION OF CARDIOTOXIC CHEMOTHERAPY: ICD-10-CM

## 2025-01-15 PROCEDURE — 99204 OFFICE O/P NEW MOD 45 MIN: CPT | Performed by: INTERNAL MEDICINE

## 2025-01-15 PROCEDURE — 93000 ELECTROCARDIOGRAM COMPLETE: CPT | Performed by: INTERNAL MEDICINE

## 2025-01-15 RX ORDER — ROSUVASTATIN CALCIUM 10 MG/1
10 TABLET, COATED ORAL
Qty: 90 TABLET | Refills: 3 | Status: SHIPPED | OUTPATIENT
Start: 2025-01-15

## 2025-01-15 NOTE — PATIENT INSTRUCTIONS
Start crestor (rosuvastatin)  Most people take this type of medication at night  If you have any muscle aches the supplement co-Q10 can be helpful.

## 2025-01-30 DIAGNOSIS — I10 HYPERTENSION, UNSPECIFIED TYPE: Primary | ICD-10-CM

## 2025-01-30 RX ORDER — CARVEDILOL 12.5 MG/1
TABLET ORAL
Qty: 180 TABLET | Refills: 1 | Status: SHIPPED | OUTPATIENT
Start: 2025-01-30

## 2025-04-09 ENCOUNTER — OFFICE VISIT (OUTPATIENT)
Dept: GYNECOLOGIC ONCOLOGY | Facility: CLINIC | Age: 65
End: 2025-04-09
Payer: COMMERCIAL

## 2025-04-09 VITALS
BODY MASS INDEX: 41.35 KG/M2 | DIASTOLIC BLOOD PRESSURE: 80 MMHG | WEIGHT: 264 LBS | HEART RATE: 95 BPM | TEMPERATURE: 97.8 F | OXYGEN SATURATION: 96 % | SYSTOLIC BLOOD PRESSURE: 140 MMHG

## 2025-04-09 DIAGNOSIS — Z08 ENCOUNTER FOR FOLLOW-UP SURVEILLANCE OF ENDOMETRIAL CANCER: Primary | ICD-10-CM

## 2025-04-09 DIAGNOSIS — Z85.42 ENCOUNTER FOR FOLLOW-UP SURVEILLANCE OF ENDOMETRIAL CANCER: Primary | ICD-10-CM

## 2025-04-09 DIAGNOSIS — Z85.42 HISTORY OF ENDOMETRIAL CANCER: ICD-10-CM

## 2025-04-09 PROCEDURE — 99212 OFFICE O/P EST SF 10 MIN: CPT | Performed by: PHYSICIAN ASSISTANT

## 2025-04-09 PROCEDURE — 99459 PELVIC EXAMINATION: CPT | Performed by: PHYSICIAN ASSISTANT

## 2025-04-09 NOTE — ASSESSMENT & PLAN NOTE
History of stage IA, grade 1 endometrial cancer s/p surgical resection in February 2021. She is clinically without evidence of endometrial cancer recurrence.      Return to the office in 1 year for continued cancer surveillance.     Patient declines screening mammograms at this time.

## 2025-04-09 NOTE — PROGRESS NOTES
Name: Moni oMss      : 1960      MRN: [id number]  Encounter Provider: Susan Coffey PA-C  Encounter Date: 2025   [facility]  :  Assessment & Plan  Encounter for follow-up surveillance of endometrial cancer         History of endometrial cancer  History of stage IA, grade 1 endometrial cancer s/p surgical resection in 2021. She is clinically without evidence of endometrial cancer recurrence.      Return to the office in 1 year for continued cancer surveillance.     Patient declines screening mammograms at this time.                     History of Present Illness     Reason for Visit / CC:  Survivorship / Surveillance Visit    Moni Moss is a 64 y.o. female   who has no new complaints today. No vaginal bleeding, abdominal/pelvic pain. Normal bowel and bladder function. No interval change in medical history since last visit. Quality of life is good.        Oncology History   Cancer Staging   History of endometrial cancer  Staging form: Corpus Uteri - Carcinoma, AJCC 8th Edition  - Clinical: FIGO Stage IA - Signed by Keesha Smith MD on 2021  Histologic grade (G): G1  Histologic grading system: 3 grade system  Lymph-vascular invasion (LVI): LVI present/identified, NOS  Oncology History   History of endometrial cancer   2020 Initial Diagnosis    Endometrial cancer (HCC)     2021 Surgery    [facility]/BSO/SLND     2021 -  Cancer Staged    Staging form: Corpus Uteri - Carcinoma, AJCC 8th Edition  - Clinical: FIGO Stage IA - Signed by Keesha Smith MD on 2021  Histologic grade (G): G1  Histologic grading system: 3 grade system  Lymph-vascular invasion (LVI): LVI present/identified, NOS          Review of Systems   Constitutional: Negative.    HENT: Negative.     Eyes: Negative.    Respiratory: Negative.     Cardiovascular: Negative.    Gastrointestinal: Negative.    Genitourinary: Negative.     Musculoskeletal: Negative.    Skin: Negative.    Neurological: Negative.    Psychiatric/Behavioral: Negative.      A complete review of systems is negative other than that noted above in the HPI.  Medical History Reviewed by provider this encounter:  Tobacco  Allergies  Meds  Problems  Med Hx  Surg Hx  Fam Hx     .  Current Outpatient Medications on File Prior to Visit   Medication Sig Dispense Refill   • aspirin 81 mg chewable tablet Chew 81 mg daily     • calcium citrate-vitamin D (CITRACAL+D) 315-200 MG-UNIT per tablet Take 1 tablet by mouth daily     • carvedilol (COREG) 12.5 mg tablet TAKE 1 TABLET (12.5 MG TOTAL) BY MOUTH 2 (TWO) TIMES A DAY WITH MEALS. 180 tablet 1   • Cholecalciferol 50 MCG (2000 UT) CAPS Take 2,000 Units by mouth daily     • irbesartan (AVAPRO) 150 mg tablet Take 150 mg by mouth daily     • levothyroxine 50 mcg tablet Take 1 tablet (50 mcg total) by mouth daily TAKE 1 TABLET EVERY DAY  !!ALVOGEN BRAND!! (Patient taking differently: Take 50 mcg by mouth daily (ALVOGEN BRAND ONLY)) 30 tablet 1   • Multiple Vitamin (MULTIVITAMIN IRON-FREE PO) Take 1 tablet by mouth daily     • Multiple Vitamins-Minerals (ZINC PO) Take 1 tablet by mouth daily (Patient not taking: Reported on 1/15/2025)     • rosuvastatin (CRESTOR) 10 MG tablet Take 1 tablet (10 mg total) by mouth daily at bedtime (Patient not taking: Reported on 4/9/2025) 90 tablet 3     No current facility-administered medications on file prior to visit.         Objective   /80 (BP Location: Left arm, Patient Position: Sitting, Cuff Size: Large)   Pulse 95   Temp 97.8 °F (36.6 °C) (Temporal)   Wt 120 kg (264 lb)   SpO2 96%   BMI 41.35 kg/m²     Body mass index is 41.35 kg/m².  Pain Screening:  Pain Score: 0-No pain    Physical Exam  Vitals reviewed. Exam conducted with a chaperone present.   Constitutional:       General: She is not in acute distress.     Appearance: Normal appearance. She is not ill-appearing.   HENT:       Head: Normocephalic and atraumatic.      Mouth/Throat:      Mouth: Mucous membranes are moist.   Eyes:      General:         Right eye: No discharge.         Left eye: No discharge.      Conjunctiva/sclera: Conjunctivae normal.   Pulmonary:      Effort: Pulmonary effort is normal.   Abdominal:      Palpations: Abdomen is soft. There is no mass.      Tenderness: There is no abdominal tenderness.      Hernia: No hernia is present.   Genitourinary:     Comments: The external female genitalia is normal. The bartholin's, uretheral and skenes glands are normal. The urethral meatus is normal (midline with no lesions). Anus without fissure or lesion. Speculum exam reveals a grossly normal vagina. No masses, lesions,discharge or bleeding. No significant cystocele or rectocele noted. Bimanual exam notes a surgical absent cervix, uterus and adnexal structures. No masses or fullness. Bladder is without fullness, mass or tenderness.  Musculoskeletal:      Right lower leg: No edema.      Left lower leg: No edema.   Skin:     General: Skin is warm and dry.      Coloration: Skin is not jaundiced.      Findings: No rash.   Neurological:      General: No focal deficit present.      Mental Status: She is alert and oriented to person, place, and time.      Cranial Nerves: No cranial nerve deficit.      Sensory: No sensory deficit.      Motor: No weakness.      Gait: Gait normal.   Psychiatric:         Mood and Affect: Mood normal.         Behavior: Behavior normal.         Thought Content: Thought content normal.         Judgment: Judgment normal.

## 2025-05-05 DIAGNOSIS — I10 PRIMARY HYPERTENSION: Primary | ICD-10-CM

## 2025-05-05 RX ORDER — IRBESARTAN 150 MG/1
150 TABLET ORAL DAILY
Qty: 90 TABLET | Refills: 3 | Status: SHIPPED | OUTPATIENT
Start: 2025-05-05

## 2025-05-05 NOTE — TELEPHONE ENCOUNTER
Reason for call:   [x] Refill   [] Prior Auth  [] Other:     Office:   [] PCP/Provider -   [x] Specialty/Provider - Cardiology Oncology/ MD Alberto    Medication: carvedilol (COREG) 12.5 mg tablet     Dose/Frequency: Take 1 tablet (150 mg total) by mouth daily    Quantity: 90    Pharmacy: [facility] - [place], [place] - [address] [phone]    Local Pharmacy   Does the patient have enough for 3 days?   [] Yes   [x] No - Send as HP to POD    Mail Away Pharmacy   Does the patient have enough for 10 days?   [] Yes   [] No - Send as HP to POD

## 2025-05-06 ENCOUNTER — TELEPHONE (OUTPATIENT)
Dept: FAMILY MEDICINE CLINIC | Facility: CLINIC | Age: 65
End: 2025-05-06

## 2025-08-13 ENCOUNTER — OFFICE VISIT (OUTPATIENT)
Age: 65
End: 2025-08-13
Payer: COMMERCIAL

## (undated) DEVICE — VESSEL SEALER EXTEND: Brand: ENDOWRIST

## (undated) DEVICE — CHLORAPREP HI-LITE 26ML ORANGE

## (undated) DEVICE — GLOVE INDICATOR PI UNDERGLOVE SZ 7 BLUE

## (undated) DEVICE — BLADELESS OBTURATOR: Brand: WECK VISTA

## (undated) DEVICE — NEEDLE SPINAL 20G X 3.5 LF

## (undated) DEVICE — TIP COVER ACCESSORY

## (undated) DEVICE — SUT MONOCRYL 4-0 PS-2 27 IN Y426H

## (undated) DEVICE — KIT, BETHLEHEM ROBOTIC PROST: Brand: CARDINAL HEALTH

## (undated) DEVICE — INTENDED FOR TISSUE SEPARATION, AND OTHER PROCEDURES THAT REQUIRE A SHARP SURGICAL BLADE TO PUNCTURE OR CUT.: Brand: BARD-PARKER SAFETY BLADES SIZE 11, STERILE

## (undated) DEVICE — ENDOPATH PNEUMONEEDLE INSUFFLATION NEEDLES WITH LUER LOCK CONNECTORS 120MM: Brand: ENDOPATH

## (undated) DEVICE — MONOPOLAR CURVED SCISSORS: Brand: ENDOWRIST

## (undated) DEVICE — PREMIUM DRY TRAY LF: Brand: MEDLINE INDUSTRIES, INC.

## (undated) DEVICE — GLOVE INDICATOR PI UNDERGLOVE SZ 6.5 BLUE

## (undated) DEVICE — AIRSEAL TUBE SMOKE EVAC LUMENX3 FILTERED

## (undated) DEVICE — SUT STRATAFIX SPIRAL 2-0 CT-1 30 CM SXPP1B410

## (undated) DEVICE — SUT MONOCRYL PLUS 4-0 PS-2 18 IN MCP496G

## (undated) DEVICE — SUT VICRYL 0 UR-6 27 IN J603H

## (undated) DEVICE — SPECIMEN TRAP: Brand: ARGYLE

## (undated) DEVICE — UTERINE MANIPULATOR RUMI 6.7 X 10 CM

## (undated) DEVICE — VISUALIZATION SYSTEM: Brand: CLEARIFY

## (undated) DEVICE — CONVERTORS UNDER BUTTOCKS DRAPE WITH FLUID CONTROL POUCH II: Brand: CONVERTORS

## (undated) DEVICE — NEEDLE 25G X 1 1/2

## (undated) DEVICE — SPECIMEN CONTAINER STERILE PEEL PACK

## (undated) DEVICE — DRAPE EQUIPMENT RF WAND

## (undated) DEVICE — TROCAR PORT ACCESS 12 X120MM W/BLDLS OPTICAL TIP AIRSEAL

## (undated) DEVICE — PROGRASP FORCEPS: Brand: ENDOWRIST

## (undated) DEVICE — ARM DRAPE

## (undated) DEVICE — GLOVE PI ULTRA TOUCH SZ.6.5

## (undated) DEVICE — OCCLUDER COLPO-PNEUMO

## (undated) DEVICE — ADHESIVE SKIN HIGH VISCOSITY EXOFIN 1ML

## (undated) DEVICE — PACK FLUENT DISP

## (undated) DEVICE — SYRINGE 10ML LL

## (undated) DEVICE — MYOSURE SEAL SET

## (undated) DEVICE — DEVICE MYOSURE TISSUE REMOVAL HYSTEROSCOPIC

## (undated) DEVICE — PVC URETHRAL CATHETER: Brand: DOVER

## (undated) DEVICE — NEEDLE 18 G X 1 1/2 SAFETY

## (undated) DEVICE — MAYO STAND COVER: Brand: CONVERTORS

## (undated) DEVICE — LUBRICANT INST ELECTROLUBE ANTISTK WO PAD

## (undated) DEVICE — GLOVE PI ULTRA TOUCH SZ.7.0

## (undated) DEVICE — 3M™ TEGADERM™ TRANSPARENT FILM DRESSING FRAME STYLE, 1628, 6 IN X 8 IN (15 CM X 20 CM), 10/CT 8CT/CASE: Brand: 3M™ TEGADERM™

## (undated) DEVICE — COLUMN DRAPE

## (undated) DEVICE — SYRINGE 10ML SLIP TIP LF

## (undated) DEVICE — CHLORHEXIDINE 4PCT 4 OZ

## (undated) DEVICE — BETHLEHEM UNIVERSAL MINOR VAG: Brand: CARDINAL HEALTH

## (undated) DEVICE — 40595 XL TRENDELENBURG POSITIONING KIT: Brand: 40595 XL TRENDELENBURG POSITIONING KIT

## (undated) DEVICE — CANNULA SEAL

## (undated) DEVICE — SUT VICRYL 3-0 SH 27 IN J416H

## (undated) DEVICE — STANDARD SURGICAL GOWN, L: Brand: CONVERTORS

## (undated) DEVICE — CLAMP TOWEL TUBING DISPOSABLE

## (undated) DEVICE — TRAY FOLEY 16FR URIMETER SILICONE SURESTEP

## (undated) DEVICE — SYRINGE 50ML LL

## (undated) DEVICE — 1820 FOAM BLOCK NEEDLE COUNTER: Brand: DEVON

## (undated) DEVICE — SYRINGE 20ML LL

## (undated) DEVICE — LARGE NEEDLE DRIVER: Brand: ENDOWRIST